# Patient Record
Sex: FEMALE | Race: WHITE | NOT HISPANIC OR LATINO | Employment: FULL TIME | URBAN - NONMETROPOLITAN AREA
[De-identification: names, ages, dates, MRNs, and addresses within clinical notes are randomized per-mention and may not be internally consistent; named-entity substitution may affect disease eponyms.]

---

## 2017-03-16 ENCOUNTER — OFFICE VISIT (OUTPATIENT)
Dept: RETAIL CLINIC | Facility: CLINIC | Age: 22
End: 2017-03-16

## 2017-03-16 DIAGNOSIS — Z23 INFLUENZA VACCINE ADMINISTERED: Primary | ICD-10-CM

## 2017-03-16 NOTE — PROGRESS NOTES
Patient presented requesting flu shot which was administered per protocol. 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 See [employer] forms.

## 2017-04-24 ENCOUNTER — TRANSCRIBE ORDERS (OUTPATIENT)
Dept: LAB | Facility: HOSPITAL | Age: 22
End: 2017-04-24

## 2017-04-24 ENCOUNTER — LAB (OUTPATIENT)
Dept: LAB | Facility: HOSPITAL | Age: 22
End: 2017-04-24

## 2017-04-24 DIAGNOSIS — R63.5 ABNORMAL WEIGHT GAIN: Primary | ICD-10-CM

## 2017-04-24 DIAGNOSIS — R63.5 ABNORMAL WEIGHT GAIN: ICD-10-CM

## 2017-04-24 LAB
25(OH)D3 SERPL-MCNC: 31.5 NG/ML (ref 30–100)
ALBUMIN SERPL-MCNC: 4.3 G/DL (ref 3.5–5)
ALBUMIN/GLOB SERPL: 1.3 G/DL (ref 1.1–2.5)
ALP SERPL-CCNC: 81 U/L (ref 24–120)
ALT SERPL W P-5'-P-CCNC: 22 U/L (ref 0–54)
ANION GAP SERPL CALCULATED.3IONS-SCNC: 11 MMOL/L (ref 4–13)
AST SERPL-CCNC: 21 U/L (ref 7–45)
AUTO MIXED CELLS #: 0.5 10*3/UL (ref 0.1–2.6)
AUTO MIXED CELLS %: 6.9 % (ref 0.1–24)
BILIRUB SERPL-MCNC: 0.5 MG/DL (ref 0.1–1)
BILIRUB UR QL STRIP: NEGATIVE
BUN BLD-MCNC: 11 MG/DL (ref 5–21)
BUN/CREAT SERPL: 13.9
CALCIUM SPEC-SCNC: 9.3 MG/DL (ref 8.4–10.4)
CHLORIDE SERPL-SCNC: 100 MMOL/L (ref 98–110)
CHOLEST SERPL-MCNC: 145 MG/DL (ref 130–200)
CLARITY UR: CLEAR
CO2 SERPL-SCNC: 28 MMOL/L (ref 24–31)
COLOR UR: YELLOW
CREAT BLD-MCNC: 0.79 MG/DL (ref 0.5–1.4)
ERYTHROCYTE [DISTWIDTH] IN BLOOD BY AUTOMATED COUNT: 12.8 % (ref 12–15)
GFR SERPL CREATININE-BSD FRML MDRD: 91 ML/MIN/1.73
GLOBULIN UR ELPH-MCNC: 3.4 GM/DL
GLUCOSE BLD-MCNC: 82 MG/DL (ref 70–100)
GLUCOSE UR STRIP-MCNC: NEGATIVE MG/DL
HBA1C MFR BLD: 5.2 %
HCT VFR BLD AUTO: 37.7 % (ref 37–47)
HDLC SERPL-MCNC: 49 MG/DL
HGB BLD-MCNC: 12.5 G/DL (ref 12–16)
HGB UR QL STRIP.AUTO: NEGATIVE
KETONES UR QL STRIP: NEGATIVE
LDLC SERPL CALC-MCNC: 79 MG/DL (ref 0–99)
LDLC/HDLC SERPL: 1.61 {RATIO}
LEUKOCYTE ESTERASE UR QL STRIP.AUTO: NEGATIVE
LYMPHOCYTES # BLD AUTO: 2 10*3/MM3 (ref 0.8–7)
LYMPHOCYTES NFR BLD AUTO: 28.7 % (ref 15–45)
MCH RBC QN AUTO: 28.4 PG (ref 28–32)
MCHC RBC AUTO-ENTMCNC: 33.2 G/DL (ref 33–36)
MCV RBC AUTO: 85.7 FL (ref 82–98)
NEUTROPHILS # BLD AUTO: 4.5 10*3/MM3 (ref 1.5–8.3)
NEUTROPHILS NFR BLD AUTO: 64.4 % (ref 39–78)
NITRITE UR QL STRIP: NEGATIVE
PH UR STRIP.AUTO: 6 [PH] (ref 5–8)
PLATELET # BLD AUTO: 298 10*3/MM3 (ref 130–400)
PMV BLD AUTO: 8.7 FL (ref 6–12)
POTASSIUM BLD-SCNC: 4.3 MMOL/L (ref 3.5–5.3)
PROT SERPL-MCNC: 7.7 G/DL (ref 6.3–8.7)
PROT UR QL STRIP: NEGATIVE
RBC # BLD AUTO: 4.4 10*6/MM3 (ref 4.2–5.4)
SODIUM BLD-SCNC: 139 MMOL/L (ref 135–145)
SP GR UR STRIP: 1.02 (ref 1–1.03)
TRIGL SERPL-MCNC: 85 MG/DL (ref 0–149)
TSH SERPL DL<=0.05 MIU/L-ACNC: 1.64 MIU/ML (ref 0.47–4.68)
UROBILINOGEN UR QL STRIP: NORMAL
VLDLC SERPL-MCNC: 17 MG/DL
WBC NRBC COR # BLD: 7 10*3/MM3 (ref 4.8–10.8)

## 2017-04-24 PROCEDURE — 83525 ASSAY OF INSULIN: CPT | Performed by: NURSE PRACTITIONER

## 2017-04-24 PROCEDURE — 36415 COLL VENOUS BLD VENIPUNCTURE: CPT

## 2017-04-24 PROCEDURE — 82306 VITAMIN D 25 HYDROXY: CPT | Performed by: NURSE PRACTITIONER

## 2017-04-24 PROCEDURE — 83036 HEMOGLOBIN GLYCOSYLATED A1C: CPT

## 2017-04-24 PROCEDURE — 80050 GENERAL HEALTH PANEL: CPT

## 2017-04-24 PROCEDURE — 80061 LIPID PANEL: CPT

## 2017-04-24 PROCEDURE — 81003 URINALYSIS AUTO W/O SCOPE: CPT

## 2017-04-25 LAB — INSULIN SERPL-ACNC: 6.9 UIU/ML (ref 2.6–24.9)

## 2018-02-27 ENCOUNTER — OFFICE VISIT (OUTPATIENT)
Dept: INTERNAL MEDICINE | Age: 23
End: 2018-02-27
Payer: COMMERCIAL

## 2018-02-27 VITALS
OXYGEN SATURATION: 98 % | HEART RATE: 87 BPM | BODY MASS INDEX: 35.79 KG/M2 | WEIGHT: 202 LBS | SYSTOLIC BLOOD PRESSURE: 122 MMHG | HEIGHT: 63 IN | DIASTOLIC BLOOD PRESSURE: 82 MMHG | RESPIRATION RATE: 16 BRPM

## 2018-02-27 DIAGNOSIS — E66.9 OBESITY (BMI 30-39.9): Primary | ICD-10-CM

## 2018-02-27 DIAGNOSIS — F31.9 BIPOLAR 1 DISORDER (HCC): ICD-10-CM

## 2018-02-27 PROCEDURE — 99214 OFFICE O/P EST MOD 30 MIN: CPT | Performed by: NURSE PRACTITIONER

## 2018-02-27 RX ORDER — LAMOTRIGINE 200 MG/1
TABLET ORAL
COMMUNITY
Start: 2018-02-02 | End: 2018-03-28 | Stop reason: SDUPTHER

## 2018-02-27 RX ORDER — PHENTERMINE HYDROCHLORIDE 15 MG/1
15 CAPSULE ORAL EVERY MORNING
Qty: 30 CAPSULE | Refills: 0 | Status: SHIPPED | OUTPATIENT
Start: 2018-02-27 | End: 2018-03-28 | Stop reason: SDUPTHER

## 2018-02-27 RX ORDER — BUPROPION HYDROCHLORIDE 150 MG/1
TABLET ORAL
COMMUNITY
Start: 2018-02-02 | End: 2018-03-28 | Stop reason: SDUPTHER

## 2018-02-27 RX ORDER — LAMOTRIGINE 100 MG/1
TABLET ORAL
COMMUNITY
Start: 2017-12-04 | End: 2018-02-27 | Stop reason: CLARIF

## 2018-02-27 ASSESSMENT — ENCOUNTER SYMPTOMS
EYE ITCHING: 0
BLOOD IN STOOL: 0
STRIDOR: 0
VOMITING: 0
CONSTIPATION: 0
COLOR CHANGE: 0
NAUSEA: 0
DIARRHEA: 0
WHEEZING: 0
ABDOMINAL PAIN: 0
CHOKING: 0
TROUBLE SWALLOWING: 0
SORE THROAT: 0
ABDOMINAL DISTENTION: 0
COUGH: 0
SHORTNESS OF BREATH: 0
EYE DISCHARGE: 0

## 2018-02-27 ASSESSMENT — PATIENT HEALTH QUESTIONNAIRE - PHQ9
2. FEELING DOWN, DEPRESSED OR HOPELESS: 0
1. LITTLE INTEREST OR PLEASURE IN DOING THINGS: 0
SUM OF ALL RESPONSES TO PHQ9 QUESTIONS 1 & 2: 0
SUM OF ALL RESPONSES TO PHQ QUESTIONS 1-9: 0

## 2018-02-27 NOTE — PROGRESS NOTES
treatment plan. Follow up as directed. MEDICATIONS:  Orders Placed This Encounter   Medications    phentermine 15 MG capsule     Sig: Take 1 capsule by mouth every morning for 30 days. Dispense:  30 capsule     Refill:  0         ORDERS:  Orders Placed This Encounter   Procedures    CBC Auto Differential    Comprehensive Metabolic Panel    TSH without Reflex       Follow-up:  Return in about 4 weeks (around 3/27/2018). PATIENT INSTRUCTIONS:  Patient Instructions   1. Obesity ; We will try phentermine for a month with diet counselling and see how she does   2. Bipolar;   She is controlled on lamictal and wellbutryn;      returnin 4 weeks with labs and physical with PAP    Electronically signed by CRISTA Cordero on 2/27/2018 at 3:35 PM

## 2018-03-27 DIAGNOSIS — E66.9 OBESITY (BMI 30-39.9): ICD-10-CM

## 2018-03-27 LAB
ALBUMIN SERPL-MCNC: 4.4 G/DL (ref 3.5–5.2)
ALP BLD-CCNC: 78 U/L (ref 35–104)
ALT SERPL-CCNC: 15 U/L (ref 5–33)
ANION GAP SERPL CALCULATED.3IONS-SCNC: 16 MMOL/L (ref 7–19)
AST SERPL-CCNC: 15 U/L (ref 5–32)
BASOPHILS ABSOLUTE: 0 K/UL (ref 0–0.2)
BASOPHILS RELATIVE PERCENT: 0.5 % (ref 0–1)
BILIRUB SERPL-MCNC: 0.3 MG/DL (ref 0.2–1.2)
BUN BLDV-MCNC: 11 MG/DL (ref 6–20)
CALCIUM SERPL-MCNC: 9.3 MG/DL (ref 8.6–10)
CHLORIDE BLD-SCNC: 100 MMOL/L (ref 98–111)
CO2: 23 MMOL/L (ref 22–29)
CREAT SERPL-MCNC: 0.7 MG/DL (ref 0.5–0.9)
EOSINOPHILS ABSOLUTE: 0.2 K/UL (ref 0–0.6)
EOSINOPHILS RELATIVE PERCENT: 2.8 % (ref 0–5)
GFR NON-AFRICAN AMERICAN: >60
GLUCOSE BLD-MCNC: 75 MG/DL (ref 74–109)
HCT VFR BLD CALC: 41.5 % (ref 37–47)
HEMOGLOBIN: 13.2 G/DL (ref 12–16)
LYMPHOCYTES ABSOLUTE: 1.5 K/UL (ref 1.1–4.5)
LYMPHOCYTES RELATIVE PERCENT: 23.9 % (ref 20–40)
MCH RBC QN AUTO: 28.6 PG (ref 27–31)
MCHC RBC AUTO-ENTMCNC: 31.8 G/DL (ref 33–37)
MCV RBC AUTO: 90 FL (ref 81–99)
MONOCYTES ABSOLUTE: 0.5 K/UL (ref 0–0.9)
MONOCYTES RELATIVE PERCENT: 8.3 % (ref 0–10)
NEUTROPHILS ABSOLUTE: 4 K/UL (ref 1.5–7.5)
NEUTROPHILS RELATIVE PERCENT: 64.2 % (ref 50–65)
PDW BLD-RTO: 13.2 % (ref 11.5–14.5)
PLATELET # BLD: 329 K/UL (ref 130–400)
PMV BLD AUTO: 9.8 FL (ref 9.4–12.3)
POTASSIUM SERPL-SCNC: 4 MMOL/L (ref 3.5–5)
RBC # BLD: 4.61 M/UL (ref 4.2–5.4)
SODIUM BLD-SCNC: 139 MMOL/L (ref 136–145)
TOTAL PROTEIN: 7.2 G/DL (ref 6.6–8.7)
TSH SERPL DL<=0.05 MIU/L-ACNC: 1.28 UIU/ML (ref 0.27–4.2)
WBC # BLD: 6.2 K/UL (ref 4.8–10.8)

## 2018-03-28 ENCOUNTER — OFFICE VISIT (OUTPATIENT)
Dept: INTERNAL MEDICINE | Age: 23
End: 2018-03-28
Payer: COMMERCIAL

## 2018-03-28 VITALS
HEIGHT: 63 IN | HEART RATE: 94 BPM | OXYGEN SATURATION: 99 % | SYSTOLIC BLOOD PRESSURE: 108 MMHG | BODY MASS INDEX: 34.2 KG/M2 | WEIGHT: 193 LBS | DIASTOLIC BLOOD PRESSURE: 64 MMHG

## 2018-03-28 DIAGNOSIS — R00.0 TACHYCARDIA: ICD-10-CM

## 2018-03-28 DIAGNOSIS — E66.9 OBESITY (BMI 30-39.9): Primary | ICD-10-CM

## 2018-03-28 DIAGNOSIS — F31.9 BIPOLAR 1 DISORDER (HCC): ICD-10-CM

## 2018-03-28 PROCEDURE — 99213 OFFICE O/P EST LOW 20 MIN: CPT | Performed by: NURSE PRACTITIONER

## 2018-03-28 RX ORDER — BUPROPION HYDROCHLORIDE 150 MG/1
150 TABLET ORAL EVERY MORNING
Qty: 30 TABLET | Refills: 3 | Status: SHIPPED | OUTPATIENT
Start: 2018-03-28 | End: 2018-06-22 | Stop reason: SDUPTHER

## 2018-03-28 RX ORDER — PHENTERMINE HYDROCHLORIDE 15 MG/1
15 CAPSULE ORAL EVERY MORNING
Qty: 30 CAPSULE | Refills: 0 | Status: SHIPPED | OUTPATIENT
Start: 2018-03-28 | End: 2018-04-23 | Stop reason: SDUPTHER

## 2018-03-28 RX ORDER — LAMOTRIGINE 200 MG/1
200 TABLET ORAL DAILY
Qty: 30 TABLET | Refills: 3 | Status: SHIPPED | OUTPATIENT
Start: 2018-03-28 | End: 2018-06-22 | Stop reason: SDUPTHER

## 2018-03-28 ASSESSMENT — ENCOUNTER SYMPTOMS
CHOKING: 0
SHORTNESS OF BREATH: 0
ABDOMINAL DISTENTION: 0
WHEEZING: 0
DIARRHEA: 0
VOMITING: 0
STRIDOR: 0
ABDOMINAL PAIN: 0
COUGH: 0
CONSTIPATION: 0
BLOOD IN STOOL: 0
COLOR CHANGE: 0
NAUSEA: 0
EYE DISCHARGE: 0
TROUBLE SWALLOWING: 0
EYE ITCHING: 0
SORE THROAT: 0

## 2018-04-23 ENCOUNTER — OFFICE VISIT (OUTPATIENT)
Dept: INTERNAL MEDICINE | Age: 23
End: 2018-04-23
Payer: COMMERCIAL

## 2018-04-23 VITALS
OXYGEN SATURATION: 98 % | DIASTOLIC BLOOD PRESSURE: 72 MMHG | HEART RATE: 91 BPM | RESPIRATION RATE: 16 BRPM | HEIGHT: 63 IN | SYSTOLIC BLOOD PRESSURE: 118 MMHG | BODY MASS INDEX: 32.78 KG/M2 | WEIGHT: 185 LBS

## 2018-04-23 DIAGNOSIS — E66.9 OBESITY (BMI 30-39.9): Primary | ICD-10-CM

## 2018-04-23 PROCEDURE — 99212 OFFICE O/P EST SF 10 MIN: CPT | Performed by: NURSE PRACTITIONER

## 2018-04-23 RX ORDER — PHENTERMINE HYDROCHLORIDE 15 MG/1
15 CAPSULE ORAL EVERY MORNING
Qty: 30 CAPSULE | Refills: 0 | Status: SHIPPED | OUTPATIENT
Start: 2018-04-23 | End: 2018-05-23 | Stop reason: SDUPTHER

## 2018-04-23 ASSESSMENT — ENCOUNTER SYMPTOMS
TROUBLE SWALLOWING: 0
CHOKING: 0
ABDOMINAL PAIN: 0
ABDOMINAL DISTENTION: 0
NAUSEA: 0
BLOOD IN STOOL: 0
EYE DISCHARGE: 0
COUGH: 0
WHEEZING: 0
SHORTNESS OF BREATH: 0
SORE THROAT: 0
DIARRHEA: 0
EYE ITCHING: 0
CONSTIPATION: 0
COLOR CHANGE: 0
STRIDOR: 0
VOMITING: 0

## 2018-05-23 ENCOUNTER — OFFICE VISIT (OUTPATIENT)
Dept: INTERNAL MEDICINE | Age: 23
End: 2018-05-23
Payer: COMMERCIAL

## 2018-05-23 VITALS
WEIGHT: 176 LBS | BODY MASS INDEX: 31.18 KG/M2 | HEART RATE: 106 BPM | RESPIRATION RATE: 16 BRPM | SYSTOLIC BLOOD PRESSURE: 120 MMHG | OXYGEN SATURATION: 99 % | HEIGHT: 63 IN | DIASTOLIC BLOOD PRESSURE: 80 MMHG

## 2018-05-23 DIAGNOSIS — E66.9 OBESITY (BMI 30-39.9): Primary | ICD-10-CM

## 2018-05-23 DIAGNOSIS — Z71.3 ENCOUNTER FOR NUTRITIONAL COUNSELING: ICD-10-CM

## 2018-05-23 DIAGNOSIS — R00.0 TACHYCARDIA: ICD-10-CM

## 2018-05-23 PROCEDURE — 99401 PREV MED CNSL INDIV APPRX 15: CPT | Performed by: NURSE PRACTITIONER

## 2018-05-23 PROCEDURE — 99213 OFFICE O/P EST LOW 20 MIN: CPT | Performed by: NURSE PRACTITIONER

## 2018-05-23 RX ORDER — PHENTERMINE HYDROCHLORIDE 15 MG/1
15 CAPSULE ORAL EVERY MORNING
Qty: 30 CAPSULE | Refills: 0 | Status: SHIPPED | OUTPATIENT
Start: 2018-05-23 | End: 2018-06-22

## 2018-06-22 ENCOUNTER — OFFICE VISIT (OUTPATIENT)
Dept: INTERNAL MEDICINE | Age: 23
End: 2018-06-22
Payer: COMMERCIAL

## 2018-06-22 VITALS
RESPIRATION RATE: 18 BRPM | DIASTOLIC BLOOD PRESSURE: 78 MMHG | OXYGEN SATURATION: 97 % | HEART RATE: 78 BPM | SYSTOLIC BLOOD PRESSURE: 122 MMHG | WEIGHT: 174 LBS | BODY MASS INDEX: 29.71 KG/M2 | HEIGHT: 64 IN

## 2018-06-22 DIAGNOSIS — R00.0 TACHYCARDIA: ICD-10-CM

## 2018-06-22 DIAGNOSIS — E66.9 OBESITY (BMI 30-39.9): Primary | ICD-10-CM

## 2018-06-22 DIAGNOSIS — F31.9 BIPOLAR 1 DISORDER (HCC): ICD-10-CM

## 2018-06-22 PROCEDURE — 99213 OFFICE O/P EST LOW 20 MIN: CPT | Performed by: NURSE PRACTITIONER

## 2018-06-22 RX ORDER — LAMOTRIGINE 200 MG/1
200 TABLET ORAL DAILY
Qty: 30 TABLET | Refills: 3 | Status: SHIPPED | OUTPATIENT
Start: 2018-06-22 | End: 2019-08-01 | Stop reason: ALTCHOICE

## 2018-06-22 RX ORDER — BUPROPION HYDROCHLORIDE 150 MG/1
150 TABLET ORAL EVERY MORNING
Qty: 30 TABLET | Refills: 3 | Status: SHIPPED | OUTPATIENT
Start: 2018-06-22 | End: 2019-08-01

## 2018-06-22 ASSESSMENT — ENCOUNTER SYMPTOMS
CONSTIPATION: 0
SHORTNESS OF BREATH: 0
VOMITING: 0
WHEEZING: 0
SORE THROAT: 0
CHOKING: 0
ABDOMINAL PAIN: 0
DIARRHEA: 0
COLOR CHANGE: 0
EYE DISCHARGE: 0
STRIDOR: 0
TROUBLE SWALLOWING: 0
COUGH: 0
EYE ITCHING: 0
BLOOD IN STOOL: 0
NAUSEA: 0
ABDOMINAL DISTENTION: 0

## 2018-07-23 ENCOUNTER — OFFICE VISIT (OUTPATIENT)
Dept: INTERNAL MEDICINE | Age: 23
End: 2018-07-23
Payer: COMMERCIAL

## 2018-07-23 VITALS
HEIGHT: 64 IN | SYSTOLIC BLOOD PRESSURE: 110 MMHG | HEART RATE: 91 BPM | RESPIRATION RATE: 16 BRPM | OXYGEN SATURATION: 98 % | DIASTOLIC BLOOD PRESSURE: 68 MMHG | BODY MASS INDEX: 28.51 KG/M2 | WEIGHT: 167 LBS

## 2018-07-23 DIAGNOSIS — E66.3 OVERWEIGHT (BMI 25.0-29.9): ICD-10-CM

## 2018-07-23 PROBLEM — E66.09 OBESITY DUE TO EXCESS CALORIES WITHOUT SERIOUS COMORBIDITY: Status: RESOLVED | Noted: 2018-07-23 | Resolved: 2018-07-23

## 2018-07-23 PROBLEM — E66.9 OBESITY (BMI 30-39.9): Status: RESOLVED | Noted: 2018-02-27 | Resolved: 2018-07-23

## 2018-07-23 PROBLEM — E66.09 OBESITY DUE TO EXCESS CALORIES WITHOUT SERIOUS COMORBIDITY: Status: ACTIVE | Noted: 2018-07-23

## 2018-07-23 PROCEDURE — 99213 OFFICE O/P EST LOW 20 MIN: CPT | Performed by: NURSE PRACTITIONER

## 2018-07-23 RX ORDER — PHENTERMINE HYDROCHLORIDE 15 MG/1
15 CAPSULE ORAL EVERY MORNING
Qty: 30 CAPSULE | Refills: 0 | Status: SHIPPED | OUTPATIENT
Start: 2018-07-23 | End: 2018-08-22 | Stop reason: SDUPTHER

## 2018-07-23 ASSESSMENT — ENCOUNTER SYMPTOMS
SHORTNESS OF BREATH: 0
ABDOMINAL DISTENTION: 0
NAUSEA: 0
ABDOMINAL PAIN: 0
TROUBLE SWALLOWING: 0
COUGH: 0
DIARRHEA: 0
VOMITING: 0
STRIDOR: 0
COLOR CHANGE: 0
EYE DISCHARGE: 0
SORE THROAT: 0
CHOKING: 0
EYE ITCHING: 0
CONSTIPATION: 0
WHEEZING: 0
BLOOD IN STOOL: 0

## 2018-07-23 NOTE — PROGRESS NOTES
tablet by mouth every morning 30 tablet 3    lamoTRIgine (LAMICTAL) 200 MG tablet Take 1 tablet by mouth daily 30 tablet 3     No current facility-administered medications for this visit. No Known Allergies    Health Maintenance   Topic Date Due    HIV screen  10/28/2009    Chlamydia screen  10/28/2010    DTaP/Tdap/Td vaccine (1 - Tdap) 10/28/2013    Cervical cancer screen  10/28/2015    Flu vaccine (1) 09/01/2018       Subjective:      Review of Systems   Constitutional: Negative for fatigue, fever and unexpected weight change. HENT: Negative for ear discharge, ear pain, mouth sores, sore throat and trouble swallowing. Eyes: Negative for discharge, itching and visual disturbance. Respiratory: Negative for cough, choking, shortness of breath, wheezing and stridor. Cardiovascular: Negative for chest pain, palpitations and leg swelling. Gastrointestinal: Negative for abdominal distention, abdominal pain, blood in stool, constipation, diarrhea, nausea and vomiting. Endocrine: Negative for cold intolerance, polydipsia and polyuria. Genitourinary: Negative for difficulty urinating, dysuria, frequency and urgency. Musculoskeletal: Negative for arthralgias and gait problem. Skin: Negative for color change and rash. Allergic/Immunologic: Negative for food allergies and immunocompromised state. Neurological: Negative for dizziness, tremors, syncope, speech difficulty, weakness and headaches. Hematological: Negative for adenopathy. Does not bruise/bleed easily. Psychiatric/Behavioral: Negative for confusion and hallucinations. Objective:     Physical Exam   Constitutional: She is oriented to person, place, and time. She appears well-developed and well-nourished. No distress. HENT:   Head: Normocephalic and atraumatic. Eyes: Pupils are equal, round, and reactive to light. Right eye exhibits no discharge. Left eye exhibits no discharge. No scleral icterus.    Neck: Normal range of Flu in 1 month with labs     Electronically signed by CRISTA Pearce on 7/23/2018 at 9:26 AM    EMR Dragon/transcription disclaimer:  Much of this encounter note is electronic transcription/translation of spoken language to printed texts. The electronic translation of spoken language may be erroneous, or at times, nonsensical words or phrases may be inadvertently transcribed.   Although I have reviewed the note for such errors, some may still exist.

## 2018-08-22 ENCOUNTER — OFFICE VISIT (OUTPATIENT)
Dept: INTERNAL MEDICINE | Age: 23
End: 2018-08-22
Payer: COMMERCIAL

## 2018-08-22 VITALS
HEIGHT: 63 IN | DIASTOLIC BLOOD PRESSURE: 72 MMHG | HEART RATE: 91 BPM | WEIGHT: 169 LBS | SYSTOLIC BLOOD PRESSURE: 118 MMHG | BODY MASS INDEX: 29.95 KG/M2 | OXYGEN SATURATION: 98 %

## 2018-08-22 DIAGNOSIS — E66.3 OVERWEIGHT (BMI 25.0-29.9): ICD-10-CM

## 2018-08-22 PROCEDURE — 99213 OFFICE O/P EST LOW 20 MIN: CPT | Performed by: NURSE PRACTITIONER

## 2018-08-22 RX ORDER — PHENTERMINE HYDROCHLORIDE 30 MG/1
30 CAPSULE ORAL EVERY MORNING
Qty: 30 CAPSULE | Refills: 0 | Status: SHIPPED | OUTPATIENT
Start: 2018-08-22 | End: 2018-09-21

## 2018-08-22 ASSESSMENT — ENCOUNTER SYMPTOMS
VOMITING: 0
CHOKING: 0
DIARRHEA: 0
WHEEZING: 0
ABDOMINAL PAIN: 0
COLOR CHANGE: 0
STRIDOR: 0
NAUSEA: 0
TROUBLE SWALLOWING: 0
COUGH: 0
EYE DISCHARGE: 0
EYE ITCHING: 0
CONSTIPATION: 0
SHORTNESS OF BREATH: 0
ABDOMINAL DISTENTION: 0
BLOOD IN STOOL: 0
SORE THROAT: 0

## 2018-08-22 NOTE — PATIENT INSTRUCTIONS
1.  OBesity   Increase phentermine to alternating 15 mg and 30 mg every other day;    Recheck in 1 month

## 2018-08-22 NOTE — PROGRESS NOTES
[facility]  [facility]  [address] [place] [address]  Dept: [phone]  Dept Fax: [phone]  Loc: [phone]    Sindy Allen is a 20 y.o. female who presents today for her medical conditions/complaints as noted below. Sindy Allen is c/o of Medication Check (Weight check for phentermine/has not lost any weight)        HPI:     HPI   1. Obesity; She has not been eating well; She and her fiance broke up and she has not been exercising either    Chief Complaint   Patient presents with    Medication Check     Weight check for phentermine/has not lost any weight       Past Medical History:   Diagnosis Date    Bipolar disorder ([facility] [address]     Depression       No past surgical history on file. Vitals 8/22/2018 7/23/2018 6/22/2018 5/23/2018 4/23/2018 9/67/4404   SYSTOLIC 514 263 [phone]   DIASTOLIC 72 68 78 80 72 64   Site Left Arm - Left Arm - - -   Position Sitting - Sitting - - -   Cuff Size - - Large Adult - - -   Pulse 91 91 78 106 91 94   Resp - 16 18 16 16 -   Weight 169 lb 167 lb 174 lb 176 lb 185 lb 193 lb   Height 5' 3\" 5' 4\" 5' 4\" 5' 3\" 5' 3\" 5' 3\"   BMI (wt*703/ht~2) 29.93 kg/m2 28.66 kg/m2 29.86 kg/m2 31.17 kg/m2 32.77 kg/m2 34.18 kg/m2   Some recent data might be hidden       Family History   Problem Relation Age of Onset    No Known Problems Mother     No Known Problems Father     No Known Problems Brother        Social History   Substance Use Topics    Smoking status: Never Smoker    Smokeless tobacco: Never Used    Alcohol use No      Current Outpatient Prescriptions   Medication Sig Dispense Refill    phentermine 30 MG capsule Take 1 capsule by mouth every morning for 30 days. . 30 capsule 0    buPROPion (WELLBUTRIN XL) 150 MG extended release tablet Take 1 tablet by mouth every morning 30 tablet 3    lamoTRIgine (LAMICTAL) 200 MG tablet Take 1 tablet by mouth daily 30 tablet 3     No current facility-administered heard.  Pulmonary/Chest: Effort normal and breath sounds normal. No respiratory distress. She has no wheezes. She has no rales. Abdominal: Soft. Bowel sounds are normal. She exhibits no distension and no mass. There is no tenderness. There is no rebound and no guarding. Musculoskeletal: Normal range of motion. She exhibits no edema or tenderness. Neurological: She is alert and oriented to person, place, and time. She has normal reflexes. No cranial nerve deficit. Coordination normal.   Skin: Skin is warm and dry. No rash noted. No erythema. Psychiatric: She has a normal mood and affect. Her behavior is normal. Judgment and thought content normal. She does not exhibit a depressed mood. /72 (Site: Left Arm, Position: Sitting)   Pulse 91   Ht 5' 3\" (1.6 m)   Wt 169 lb (76.7 kg)   LMP 08/18/2018   SpO2 98%   BMI 29.94 kg/m²     Assessment:       Diagnosis Orders   1. Overweight (BMI 25.0-29.9)  phentermine 30 MG capsule       Plan:        Patient given educational materials - see patient instructions. Discussed use, benefit, and side effects of prescribed medications. All patient questions answered. Pt voiced understanding. Reviewed health maintenance. Instructed to continue current medications, diet and exercise. Patient agreed with treatment plan. Follow up as directed. MEDICATIONS:  Orders Placed This Encounter   Medications    phentermine 30 MG capsule     Sig: Take 1 capsule by mouth every morning for 30 days. .     Dispense:  30 capsule     Refill:  0         ORDERS:  No orders of the defined types were placed in this encounter. Follow-up:  Return in about 4 weeks (around 9/19/2018).     PATIENT INSTRUCTIONS:  Patient Instructions   1.  OBesity   Increase phentermine to alternating 15 mg and 30 mg every other day;    Recheck in 1 month     Electronically signed by CRISTA Anna on 8/22/2018 at 4:56 PM    EMR Dragon/transcription disclaimer:  Much of this encounter note is electronic transcription/translation of spoken language to printed texts. The electronic translation of spoken language may be erroneous, or at times, nonsensical words or phrases may be inadvertently transcribed.   Although I have reviewed the note for such errors, some may still exist.

## 2018-09-25 ENCOUNTER — OFFICE VISIT (OUTPATIENT)
Dept: INTERNAL MEDICINE | Age: 23
End: 2018-09-25
Payer: COMMERCIAL

## 2018-09-25 VITALS
RESPIRATION RATE: 16 BRPM | WEIGHT: 169 LBS | HEART RATE: 68 BPM | SYSTOLIC BLOOD PRESSURE: 102 MMHG | BODY MASS INDEX: 29.95 KG/M2 | OXYGEN SATURATION: 96 % | DIASTOLIC BLOOD PRESSURE: 72 MMHG | HEIGHT: 63 IN

## 2018-09-25 DIAGNOSIS — E66.3 OVERWEIGHT (BMI 25.0-29.9): Primary | ICD-10-CM

## 2018-09-25 DIAGNOSIS — F32.A DEPRESSION, UNSPECIFIED DEPRESSION TYPE: ICD-10-CM

## 2018-09-25 PROCEDURE — 99213 OFFICE O/P EST LOW 20 MIN: CPT | Performed by: NURSE PRACTITIONER

## 2018-09-25 ASSESSMENT — ENCOUNTER SYMPTOMS
COUGH: 0
CONSTIPATION: 0
EYE DISCHARGE: 0
ABDOMINAL DISTENTION: 0
NAUSEA: 0
EYE ITCHING: 0
STRIDOR: 0
TROUBLE SWALLOWING: 0
CHOKING: 0
ABDOMINAL PAIN: 0
SHORTNESS OF BREATH: 0
VOMITING: 0
DIARRHEA: 0
WHEEZING: 0
BLOOD IN STOOL: 0
SORE THROAT: 0
COLOR CHANGE: 0

## 2018-09-25 NOTE — PROGRESS NOTES
[facility]  [address] [place] [address]  Dept: [phone]  Dept Fax: [phone]  Loc: [phone]    Andrew Alvarenga is a 20 y.o. female who presents today for her medical conditions/complaints as noted below. Andrew Alvarenga is c/o of Obesity (Patient here for follow up on weight.)        HPI:     HPI   1. Overweight - has been on phentermine; We had increased last month but she has not lost any weight at all;    2. Depression -  Stable for now; she and her boyfriend are back together; She is happy and plans to  in [facility]   Chief Complaint   Patient presents with    Obesity     Patient here for follow up on weight. Past Medical History:   Diagnosis Date    Bipolar disorder ([address]     Depression       History reviewed. No pertinent surgical history.     Vitals 9/25/2018 8/22/2018 7/23/2018 6/22/2018 5/23/2018 5/37/9635   SYSTOLIC 451 362 [phone] 023   DIASTOLIC 72 72 68 78 80 72   Site - Left Arm - Left Arm - -   Position - Sitting - Sitting - -   Cuff Size - - - Large Adult - -   Pulse 68 91 91 78 106 91   Resp 16 - 16 18 16 16   Weight 169 lb 169 lb 167 lb 174 lb 176 lb 185 lb   Height 5' 3\" 5' 3\" 5' 4\" 5' 4\" 5' 3\" 5' 3\"   BMI (wt*703/ht~2) 29.93 kg/m2 29.93 kg/m2 28.66 kg/m2 29.86 kg/m2 31.17 kg/m2 32.77 kg/m2   Some recent data might be hidden       Family History   Problem Relation Age of Onset    No Known Problems Mother     No Known Problems Father     No Known Problems Brother        Social History   Substance Use Topics    Smoking status: Never Smoker    Smokeless tobacco: Never Used    Alcohol use No      Current Outpatient Prescriptions   Medication Sig Dispense Refill    liraglutide-weight management 18 MG/3ML SOPN Inject 3 mg into the skin daily 3 pen 3    SPRINTEC 28 0.25-35 MG-MCG per tablet       buPROPion (WELLBUTRIN XL) 150 MG extended release tablet Take 1 tablet by mouth every morning 30

## 2018-11-14 ENCOUNTER — OFFICE VISIT (OUTPATIENT)
Dept: INTERNAL MEDICINE | Age: 23
End: 2018-11-14
Payer: COMMERCIAL

## 2018-11-14 VITALS
WEIGHT: 181 LBS | SYSTOLIC BLOOD PRESSURE: 120 MMHG | HEART RATE: 84 BPM | BODY MASS INDEX: 32.07 KG/M2 | OXYGEN SATURATION: 98 % | RESPIRATION RATE: 16 BRPM | DIASTOLIC BLOOD PRESSURE: 68 MMHG | HEIGHT: 63 IN

## 2018-11-14 DIAGNOSIS — E66.3 OVERWEIGHT: ICD-10-CM

## 2018-11-14 DIAGNOSIS — F31.9 BIPOLAR 1 DISORDER (HCC): ICD-10-CM

## 2018-11-14 DIAGNOSIS — Z00.00 HEALTH CARE MAINTENANCE: Primary | ICD-10-CM

## 2018-11-14 PROCEDURE — 99213 OFFICE O/P EST LOW 20 MIN: CPT | Performed by: NURSE PRACTITIONER

## 2018-11-14 ASSESSMENT — ENCOUNTER SYMPTOMS
DIARRHEA: 0
CHOKING: 0
TROUBLE SWALLOWING: 0
BLOOD IN STOOL: 0
CONSTIPATION: 0
EYE DISCHARGE: 0
SHORTNESS OF BREATH: 0
COLOR CHANGE: 0
COUGH: 0
ABDOMINAL PAIN: 0
STRIDOR: 0
ABDOMINAL DISTENTION: 0
EYE ITCHING: 0
WHEEZING: 0
VOMITING: 0
NAUSEA: 0
SORE THROAT: 0

## 2018-11-14 NOTE — PROGRESS NOTES
depressed mood. /68   Pulse 84   Resp 16   Ht 5' 3\" (1.6 m)   Wt 181 lb (82.1 kg)   SpO2 98%   BMI 32.06 kg/m²     Assessment:       Diagnosis Orders   1. Health care maintenance  CBC Auto Differential    Comprehensive Metabolic Panel    Lipid Panel    Vitamin D 25 Hydroxy    TSH without Reflex   2. Bipolar 1 disorder ([facility] [address]     3. Overweight  bmi 32         Plan:        Patient given educational materials - see patient instructions. Discussed use, benefit, and side effects of prescribed medications. Allpatient questions answered. Pt voiced understanding. Reviewed health maintenance. Instructed to continue current medications, diet and exercise. Patient agreed with treatment plan. Follow up as directed. MEDICATIONS:  No orders of the defined types were placed in this encounter. ORDERS:  Orders Placed This Encounter   Procedures    CBC Auto Differential    Comprehensive Metabolic Panel    Lipid Panel    Vitamin D 25 Hydroxy    TSH without Reflex       Follow-up:  Return in about 3 months (around 2/14/2019) for have labs done prior to appt. PATIENT INSTRUCTIONS:  Patient Instructions   1. Overweight;   Exercise watch portion control;    2. Bipolar stable for now     She is going to check with her insurance now that she is on husbands to see if they will cover saxenda; Fu in 3 months with labs     Electronically signed by CRISTA Moody on 11/14/2018 at 10:19 AM    EMRDragon/transcription disclaimer:  Much of this encounter note is electronic transcription/translation of spoken language to printed texts. The electronic translation of spoken language may be erroneous, or at times,nonsensical words or phrases may be inadvertently transcribed.   Although I have reviewed the note for such errors, some may still exist.

## 2018-12-31 ENCOUNTER — TELEPHONE (OUTPATIENT)
Dept: INTERNAL MEDICINE | Age: 23
End: 2018-12-31

## 2018-12-31 NOTE — TELEPHONE ENCOUNTER
Pt said that her insurance situation is fixed now and she needs saxenda called in now. If you have any questions please call her back.  Pharmacy is [facility] on [address]    Make

## 2019-01-14 ENCOUNTER — TELEPHONE (OUTPATIENT)
Dept: INTERNAL MEDICINE | Age: 24
End: 2019-01-14

## 2019-01-14 DIAGNOSIS — E66.9 OBESITY (BMI 30-39.9): Primary | ICD-10-CM

## 2019-01-14 RX ORDER — PHENTERMINE HYDROCHLORIDE 30 MG/1
30 CAPSULE ORAL EVERY MORNING
Qty: 30 CAPSULE | Refills: 0 | Status: SHIPPED | OUTPATIENT
Start: 2019-01-14 | End: 2019-03-01 | Stop reason: SDUPTHER

## 2019-03-01 ENCOUNTER — OFFICE VISIT (OUTPATIENT)
Dept: INTERNAL MEDICINE | Age: 24
End: 2019-03-01
Payer: COMMERCIAL

## 2019-03-01 VITALS
WEIGHT: 174 LBS | BODY MASS INDEX: 30.83 KG/M2 | RESPIRATION RATE: 16 BRPM | HEIGHT: 63 IN | HEART RATE: 73 BPM | DIASTOLIC BLOOD PRESSURE: 82 MMHG | SYSTOLIC BLOOD PRESSURE: 112 MMHG

## 2019-03-01 DIAGNOSIS — F31.9 BIPOLAR DEPRESSION (HCC): Primary | ICD-10-CM

## 2019-03-01 DIAGNOSIS — E66.9 OBESITY (BMI 30.0-34.9): ICD-10-CM

## 2019-03-01 DIAGNOSIS — E66.9 OBESITY (BMI 30-39.9): ICD-10-CM

## 2019-03-01 PROCEDURE — 99213 OFFICE O/P EST LOW 20 MIN: CPT | Performed by: NURSE PRACTITIONER

## 2019-03-01 RX ORDER — PHENTERMINE HYDROCHLORIDE 30 MG/1
30 CAPSULE ORAL EVERY MORNING
Qty: 30 CAPSULE | Refills: 0 | Status: SHIPPED | OUTPATIENT
Start: 2019-03-01 | End: 2019-03-31

## 2019-03-01 ASSESSMENT — ENCOUNTER SYMPTOMS
STRIDOR: 0
TROUBLE SWALLOWING: 0
EYE DISCHARGE: 0
WHEEZING: 0
COUGH: 0
COLOR CHANGE: 0
VOMITING: 0
ABDOMINAL PAIN: 0
BLOOD IN STOOL: 0
NAUSEA: 0
SORE THROAT: 0
CHOKING: 0
ABDOMINAL DISTENTION: 0
SHORTNESS OF BREATH: 0
DIARRHEA: 0
EYE ITCHING: 0
CONSTIPATION: 0

## 2019-08-01 ENCOUNTER — OFFICE VISIT (OUTPATIENT)
Dept: PRIMARY CARE CLINIC | Age: 24
End: 2019-08-01
Payer: COMMERCIAL

## 2019-08-01 VITALS
HEART RATE: 82 BPM | BODY MASS INDEX: 30.22 KG/M2 | WEIGHT: 177 LBS | HEIGHT: 64 IN | SYSTOLIC BLOOD PRESSURE: 121 MMHG | TEMPERATURE: 97.6 F | DIASTOLIC BLOOD PRESSURE: 82 MMHG

## 2019-08-01 DIAGNOSIS — E66.9 OBESITY (BMI 30-39.9): ICD-10-CM

## 2019-08-01 DIAGNOSIS — Z79.899 MEDICATION MANAGEMENT: Primary | ICD-10-CM

## 2019-08-01 DIAGNOSIS — Z00.00 ENCOUNTER FOR MEDICAL EXAMINATION TO ESTABLISH CARE: ICD-10-CM

## 2019-08-01 PROCEDURE — 99203 OFFICE O/P NEW LOW 30 MIN: CPT | Performed by: FAMILY MEDICINE

## 2019-08-01 RX ORDER — PHENTERMINE HYDROCHLORIDE 30 MG/1
30 CAPSULE ORAL EVERY MORNING
Qty: 30 CAPSULE | Refills: 0 | Status: SHIPPED | OUTPATIENT
Start: 2019-08-01 | End: 2019-08-31

## 2019-08-01 ASSESSMENT — ENCOUNTER SYMPTOMS
WHEEZING: 0
VOMITING: 0
BACK PAIN: 0
EYE DISCHARGE: 0
COLOR CHANGE: 0
COUGH: 0
DIARRHEA: 0
ABDOMINAL PAIN: 0
NAUSEA: 0

## 2019-08-01 NOTE — PROGRESS NOTES
SUBJECTIVE:    Patient ID: Arik Campoverde is a 24 y.o. female. HPI:   Patient presents today to establish care. She was previously seeing Briseida Choi at internal medicine. She states that she has a history of bipolar and depression but is not currently on any medications. She states that she has not been on anything for about a year and her symptoms have been well controlled. She states that she has a history of fighting her weight. She states that she has been on phentermine in the past and has done well with this. She is wanting to know she can get a prescription for this today. She states that she does well when she takes this and it does help her with weight loss. She states that she has not taken this in the last 3 or 4 months. She would like to get started back on it now to help with her appetite suppression of possible. Past Medical History:   Diagnosis Date    Bipolar disorder ([facility] [address]     Depression       Current Outpatient Medications   Medication Sig Dispense Refill    phentermine 30 MG capsule Take 1 capsule by mouth every morning for 30 days. 30 capsule 0     No current facility-administered medications for this visit. No Known Allergies    Review of Systems   Constitutional: Negative for activity change, appetite change and fever. HENT: Negative for congestion and nosebleeds. Eyes: Negative for discharge. Respiratory: Negative for cough and wheezing. Cardiovascular: Negative for chest pain and leg swelling. Gastrointestinal: Negative for abdominal pain, diarrhea, nausea and vomiting. Genitourinary: Negative for difficulty urinating, frequency and urgency. Musculoskeletal: Negative for back pain and gait problem. Skin: Negative for color change and rash. Neurological: Negative for dizziness and headaches. Hematological: Does not bruise/bleed easily. Psychiatric/Behavioral: Negative for sleep disturbance and suicidal ideas.        OBJECTIVE:     Physical

## 2019-09-17 ENCOUNTER — OFFICE VISIT (OUTPATIENT)
Dept: PRIMARY CARE CLINIC | Age: 24
End: 2019-09-17
Payer: COMMERCIAL

## 2019-09-17 VITALS
WEIGHT: 179 LBS | DIASTOLIC BLOOD PRESSURE: 82 MMHG | SYSTOLIC BLOOD PRESSURE: 116 MMHG | HEIGHT: 64 IN | OXYGEN SATURATION: 98 % | BODY MASS INDEX: 30.56 KG/M2 | HEART RATE: 71 BPM | TEMPERATURE: 98.2 F | RESPIRATION RATE: 22 BRPM

## 2019-09-17 DIAGNOSIS — E66.9 OBESITY (BMI 30-39.9): Primary | ICD-10-CM

## 2019-09-17 DIAGNOSIS — Z79.899 MEDICATION MANAGEMENT: ICD-10-CM

## 2019-09-17 PROCEDURE — 99213 OFFICE O/P EST LOW 20 MIN: CPT | Performed by: FAMILY MEDICINE

## 2019-09-17 RX ORDER — PHENTERMINE HYDROCHLORIDE 37.5 MG/1
37.5 CAPSULE ORAL EVERY MORNING
COMMUNITY
End: 2019-09-17 | Stop reason: SDUPTHER

## 2019-09-17 RX ORDER — PHENTERMINE HYDROCHLORIDE 37.5 MG/1
37.5 CAPSULE ORAL EVERY MORNING
Qty: 30 CAPSULE | Refills: 0 | Status: SHIPPED | OUTPATIENT
Start: 2019-09-17 | End: 2019-10-17

## 2019-09-18 ASSESSMENT — ENCOUNTER SYMPTOMS
COLOR CHANGE: 0
DIARRHEA: 0
ABDOMINAL PAIN: 0
BACK PAIN: 0
NAUSEA: 0
VOMITING: 0
EYE DISCHARGE: 0
WHEEZING: 0
COUGH: 0

## 2019-10-15 ENCOUNTER — LAB (OUTPATIENT)
Dept: LAB | Facility: HOSPITAL | Age: 24
End: 2019-10-15

## 2019-10-15 ENCOUNTER — TRANSCRIBE ORDERS (OUTPATIENT)
Dept: ADMINISTRATIVE | Facility: HOSPITAL | Age: 24
End: 2019-10-15

## 2019-10-15 DIAGNOSIS — Z32.01 PREGNANCY EXAMINATION OR TEST, POSITIVE RESULT: Primary | ICD-10-CM

## 2019-10-15 DIAGNOSIS — Z32.01 PREGNANCY TEST-POSITIVE: Primary | ICD-10-CM

## 2019-10-15 LAB
ABO GROUP BLD: NORMAL
HCG INTACT+B SERPL-ACNC: 358.9 MIU/ML
RH BLD: POSITIVE

## 2019-10-15 PROCEDURE — 87625 HPV TYPES 16 & 18 ONLY: CPT | Performed by: OBSTETRICS & GYNECOLOGY

## 2019-10-15 PROCEDURE — 86900 BLOOD TYPING SEROLOGIC ABO: CPT | Performed by: OBSTETRICS & GYNECOLOGY

## 2019-10-15 PROCEDURE — 87491 CHLMYD TRACH DNA AMP PROBE: CPT | Performed by: OBSTETRICS & GYNECOLOGY

## 2019-10-15 PROCEDURE — 36415 COLL VENOUS BLD VENIPUNCTURE: CPT | Performed by: OBSTETRICS & GYNECOLOGY

## 2019-10-15 PROCEDURE — 86901 BLOOD TYPING SEROLOGIC RH(D): CPT | Performed by: OBSTETRICS & GYNECOLOGY

## 2019-10-15 PROCEDURE — 87798 DETECT AGENT NOS DNA AMP: CPT | Performed by: OBSTETRICS & GYNECOLOGY

## 2019-10-15 PROCEDURE — 87661 TRICHOMONAS VAGINALIS AMPLIF: CPT | Performed by: OBSTETRICS & GYNECOLOGY

## 2019-10-15 PROCEDURE — 84702 CHORIONIC GONADOTROPIN TEST: CPT | Performed by: OBSTETRICS & GYNECOLOGY

## 2019-10-15 PROCEDURE — G0123 SCREEN CERV/VAG THIN LAYER: HCPCS | Performed by: OBSTETRICS & GYNECOLOGY

## 2019-10-15 PROCEDURE — 87591 N.GONORRHOEAE DNA AMP PROB: CPT | Performed by: OBSTETRICS & GYNECOLOGY

## 2019-10-15 PROCEDURE — 87624 HPV HI-RISK TYP POOLED RSLT: CPT | Performed by: OBSTETRICS & GYNECOLOGY

## 2019-10-15 PROCEDURE — 87801 DETECT AGNT MULT DNA AMPLI: CPT | Performed by: OBSTETRICS & GYNECOLOGY

## 2019-10-16 LAB
GEN CATEG CVX/VAG CYTO-IMP: ABNORMAL
HPV I/H RISK 4 DNA CVX QL PROBE+SIG AMP: DETECTED
HPV16 DNA SPEC QL NAA+PROBE: DETECTED
HPV18+45 E6+E7 MRNA CVX QL NAA+PROBE: NOT DETECTED
LAB AP CASE REPORT: ABNORMAL
LAB AP GYN ADDITIONAL INFORMATION: ABNORMAL
LAB AP GYN OTHER FINDINGS: ABNORMAL
PATH INTERP SPEC-IMP: ABNORMAL
STAT OF ADQ CVX/VAG CYTO-IMP: ABNORMAL

## 2019-10-17 LAB
A VAGINAE DNA VAG QL NAA+PROBE: NORMAL SCORE
BVAB2 DNA VAG QL NAA+PROBE: NORMAL SCORE
C ALBICANS DNA VAG QL NAA+PROBE: NEGATIVE
C GLABRATA DNA VAG QL NAA+PROBE: NEGATIVE
C TRACH RRNA SPEC DONR QL NAA+PROBE: NEGATIVE
MEGASPHAERA 1: NORMAL SCORE
N GONORRHOEA DNA SPEC QL NAA+PROBE: NEGATIVE
T VAGINALIS RRNA GENITAL QL PROBE: NEGATIVE

## 2019-10-18 LAB
EXTERNAL HEPATITIS B SURFACE ANTIGEN: NEGATIVE
EXTERNAL HEPATITIS C AB: NORMAL
EXTERNAL HERPES PCR: NORMAL
EXTERNAL RUBELLA QUALITATIVE: NORMAL
EXTERNAL SYPHILIS RPR SCREEN: NORMAL
EXTERNAL VDRL: NEGATIVE
HIV1 P24 AG SERPL QL IA: NEGATIVE

## 2019-12-31 LAB — EXTERNAL AFP: NORMAL

## 2020-01-16 ENCOUNTER — HOSPITAL ENCOUNTER (INPATIENT)
Facility: HOSPITAL | Age: 25
LOS: 4 days | Discharge: HOME | End: 2020-01-20
Attending: OBSTETRICS & GYNECOLOGY | Admitting: OBSTETRICS & GYNECOLOGY

## 2020-01-16 ENCOUNTER — HOSPITAL ENCOUNTER (EMERGENCY)
Facility: HOSPITAL | Age: 25
End: 2020-01-16

## 2020-01-16 ENCOUNTER — APPOINTMENT (OUTPATIENT)
Dept: ULTRASOUND IMAGING | Facility: HOSPITAL | Age: 25
End: 2020-01-16

## 2020-01-16 PROBLEM — O42.919 PRETERM PREMATURE RUPTURE OF MEMBRANES (PPROM) WITH UNKNOWN ONSET OF LABOR: Status: ACTIVE | Noted: 2020-01-16

## 2020-01-16 LAB
ABO GROUP BLD: NORMAL
APTT PPP: 30.7 SECONDS (ref 24.1–35)
BASOPHILS # BLD AUTO: 0.02 10*3/MM3 (ref 0–0.2)
BASOPHILS NFR BLD AUTO: 0.2 % (ref 0–1.5)
BILIRUB UR QL STRIP: NEGATIVE
BLD GP AB SCN SERPL QL: NEGATIVE
CLARITY UR: CLEAR
CLUE CELLS SPEC QL WET PREP: ABNORMAL
COLOR UR: YELLOW
D DIMER PPP FEU-MCNC: 0.78 MG/L (FEU) (ref 0–0.5)
DEPRECATED RDW RBC AUTO: 38.9 FL (ref 37–54)
EOSINOPHIL # BLD AUTO: 0.23 10*3/MM3 (ref 0–0.4)
EOSINOPHIL NFR BLD AUTO: 2.5 % (ref 0.3–6.2)
ERYTHROCYTE [DISTWIDTH] IN BLOOD BY AUTOMATED COUNT: 12.3 % (ref 12.3–15.4)
FIBRINOGEN PPP-MCNC: 455 MG/DL (ref 240–460)
FSP PPP LA-ACNC: NORMAL
GLUCOSE UR STRIP-MCNC: NEGATIVE MG/DL
HCT VFR BLD AUTO: 34.7 % (ref 34–46.6)
HGB BLD-MCNC: 11.9 G/DL (ref 12–15.9)
HGB F BLD QL KLEIH BETKE: NORMAL
HGB UR QL STRIP.AUTO: NEGATIVE
HYDATID CYST SPEC WET PREP: ABNORMAL
IMM GRANULOCYTES # BLD AUTO: 0.04 10*3/MM3 (ref 0–0.05)
IMM GRANULOCYTES NFR BLD AUTO: 0.4 % (ref 0–0.5)
INR PPP: 0.87 (ref 0.91–1.09)
KETONES UR QL STRIP: NEGATIVE
LEUKOCYTE ESTERASE UR QL STRIP.AUTO: NEGATIVE
LYMPHOCYTES # BLD AUTO: 1.77 10*3/MM3 (ref 0.7–3.1)
LYMPHOCYTES NFR BLD AUTO: 19.1 % (ref 19.6–45.3)
MCH RBC QN AUTO: 29.8 PG (ref 26.6–33)
MCHC RBC AUTO-ENTMCNC: 34.3 G/DL (ref 31.5–35.7)
MCV RBC AUTO: 86.8 FL (ref 79–97)
MONOCYTES # BLD AUTO: 0.54 10*3/MM3 (ref 0.1–0.9)
MONOCYTES NFR BLD AUTO: 5.8 % (ref 5–12)
NEUTROPHILS # BLD AUTO: 6.65 10*3/MM3 (ref 1.7–7)
NEUTROPHILS NFR BLD AUTO: 72 % (ref 42.7–76)
NITRITE UR QL STRIP: NEGATIVE
NRBC BLD AUTO-RTO: 0 /100 WBC (ref 0–0.2)
PH UR STRIP.AUTO: 7 [PH] (ref 5–8)
PLATELET # BLD AUTO: 238 10*3/MM3 (ref 140–450)
PMV BLD AUTO: 10 FL (ref 6–12)
PROT UR QL STRIP: NEGATIVE
PROTHROMBIN TIME: 12.1 SECONDS (ref 11.9–14.6)
RBC # BLD AUTO: 4 10*6/MM3 (ref 3.77–5.28)
RH BLD: POSITIVE
SP GR UR STRIP: 1.01 (ref 1–1.03)
T VAGINALIS SPEC QL WET PREP: ABNORMAL
T&S EXPIRATION DATE: NORMAL
UROBILINOGEN UR QL STRIP: NORMAL
WBC NRBC COR # BLD: 9.25 10*3/MM3 (ref 3.4–10.8)
WBC SPEC QL WET PREP: ABNORMAL
YEAST GENITAL QL WET PREP: ABNORMAL

## 2020-01-16 PROCEDURE — 87210 SMEAR WET MOUNT SALINE/INK: CPT | Performed by: OBSTETRICS & GYNECOLOGY

## 2020-01-16 PROCEDURE — 25010000002 AZITHROMYCIN PER 500 MG: Performed by: OBSTETRICS & GYNECOLOGY

## 2020-01-16 PROCEDURE — 86850 RBC ANTIBODY SCREEN: CPT | Performed by: OBSTETRICS & GYNECOLOGY

## 2020-01-16 PROCEDURE — 86900 BLOOD TYPING SEROLOGIC ABO: CPT | Performed by: OBSTETRICS & GYNECOLOGY

## 2020-01-16 PROCEDURE — 86901 BLOOD TYPING SEROLOGIC RH(D): CPT | Performed by: OBSTETRICS & GYNECOLOGY

## 2020-01-16 PROCEDURE — 85362 FIBRIN DEGRADATION PRODUCTS: CPT | Performed by: OBSTETRICS & GYNECOLOGY

## 2020-01-16 PROCEDURE — 85610 PROTHROMBIN TIME: CPT | Performed by: OBSTETRICS & GYNECOLOGY

## 2020-01-16 PROCEDURE — 85379 FIBRIN DEGRADATION QUANT: CPT | Performed by: OBSTETRICS & GYNECOLOGY

## 2020-01-16 PROCEDURE — 85384 FIBRINOGEN ACTIVITY: CPT | Performed by: OBSTETRICS & GYNECOLOGY

## 2020-01-16 PROCEDURE — 85025 COMPLETE CBC W/AUTO DIFF WBC: CPT | Performed by: OBSTETRICS & GYNECOLOGY

## 2020-01-16 PROCEDURE — 76805 OB US >/= 14 WKS SNGL FETUS: CPT

## 2020-01-16 PROCEDURE — 36415 COLL VENOUS BLD VENIPUNCTURE: CPT | Performed by: OBSTETRICS & GYNECOLOGY

## 2020-01-16 PROCEDURE — 25010000002 AMPICILLIN PER 500 MG: Performed by: OBSTETRICS & GYNECOLOGY

## 2020-01-16 PROCEDURE — 85460 HEMOGLOBIN FETAL: CPT | Performed by: OBSTETRICS & GYNECOLOGY

## 2020-01-16 PROCEDURE — 85730 THROMBOPLASTIN TIME PARTIAL: CPT | Performed by: OBSTETRICS & GYNECOLOGY

## 2020-01-16 PROCEDURE — 81003 URINALYSIS AUTO W/O SCOPE: CPT | Performed by: OBSTETRICS & GYNECOLOGY

## 2020-01-16 PROCEDURE — G0378 HOSPITAL OBSERVATION PER HR: HCPCS

## 2020-01-16 RX ORDER — SODIUM CHLORIDE 0.9 % (FLUSH) 0.9 %
10 SYRINGE (ML) INJECTION AS NEEDED
Status: DISCONTINUED | OUTPATIENT
Start: 2020-01-16 | End: 2020-01-20 | Stop reason: HOSPADM

## 2020-01-16 RX ORDER — PRENATAL VIT/IRON FUM/FOLIC AC 27MG-0.8MG
1 TABLET ORAL DAILY
COMMUNITY
End: 2020-05-26

## 2020-01-16 RX ORDER — PRENATAL VIT/IRON FUM/FOLIC AC 27MG-0.8MG
1 TABLET ORAL DAILY
Status: DISCONTINUED | OUTPATIENT
Start: 2020-01-17 | End: 2020-01-20 | Stop reason: HOSPADM

## 2020-01-16 RX ORDER — SODIUM CHLORIDE, SODIUM LACTATE, POTASSIUM CHLORIDE, CALCIUM CHLORIDE 600; 310; 30; 20 MG/100ML; MG/100ML; MG/100ML; MG/100ML
100 INJECTION, SOLUTION INTRAVENOUS CONTINUOUS
Status: DISCONTINUED | OUTPATIENT
Start: 2020-01-16 | End: 2020-01-20 | Stop reason: HOSPADM

## 2020-01-16 RX ORDER — SODIUM CHLORIDE 0.9 % (FLUSH) 0.9 %
10 SYRINGE (ML) INJECTION EVERY 12 HOURS SCHEDULED
Status: DISCONTINUED | OUTPATIENT
Start: 2020-01-16 | End: 2020-01-20 | Stop reason: HOSPADM

## 2020-01-16 RX ORDER — FERROUS SULFATE 325(65) MG
325 TABLET ORAL
Status: DISCONTINUED | OUTPATIENT
Start: 2020-01-16 | End: 2020-01-20 | Stop reason: HOSPADM

## 2020-01-16 RX ADMIN — SODIUM CHLORIDE, POTASSIUM CHLORIDE, SODIUM LACTATE AND CALCIUM CHLORIDE 125 ML/HR: 600; 310; 30; 20 INJECTION, SOLUTION INTRAVENOUS at 20:55

## 2020-01-16 RX ADMIN — AZITHROMYCIN MONOHYDRATE 500 MG: 500 INJECTION, POWDER, LYOPHILIZED, FOR SOLUTION INTRAVENOUS at 23:04

## 2020-01-16 RX ADMIN — AMPICILLIN SODIUM 2 G: 2 INJECTION, POWDER, FOR SOLUTION INTRAMUSCULAR; INTRAVENOUS at 21:24

## 2020-01-16 RX ADMIN — FERROUS SULFATE TAB 325 MG (65 MG ELEMENTAL FE) 325 MG: 325 (65 FE) TAB at 23:48

## 2020-01-17 PROCEDURE — 25010000002 AMPICILLIN PER 500 MG: Performed by: OBSTETRICS & GYNECOLOGY

## 2020-01-17 PROCEDURE — 25010000002 AZITHROMYCIN PER 500 MG: Performed by: OBSTETRICS & GYNECOLOGY

## 2020-01-17 RX ORDER — ACETAMINOPHEN 325 MG/1
650 TABLET ORAL EVERY 6 HOURS PRN
Status: DISCONTINUED | OUTPATIENT
Start: 2020-01-17 | End: 2020-01-20 | Stop reason: HOSPADM

## 2020-01-17 RX ADMIN — ACETAMINOPHEN 650 MG: 325 TABLET, FILM COATED ORAL at 18:38

## 2020-01-17 RX ADMIN — FERROUS SULFATE TAB 325 MG (65 MG ELEMENTAL FE) 325 MG: 325 (65 FE) TAB at 13:11

## 2020-01-17 RX ADMIN — FERROUS SULFATE TAB 325 MG (65 MG ELEMENTAL FE) 325 MG: 325 (65 FE) TAB at 09:10

## 2020-01-17 RX ADMIN — AMPICILLIN SODIUM 2 G: 2 INJECTION, POWDER, FOR SOLUTION INTRAMUSCULAR; INTRAVENOUS at 15:56

## 2020-01-17 RX ADMIN — AMPICILLIN SODIUM 2 G: 2 INJECTION, POWDER, FOR SOLUTION INTRAMUSCULAR; INTRAVENOUS at 03:16

## 2020-01-17 RX ADMIN — AMPICILLIN SODIUM 2 G: 2 INJECTION, POWDER, FOR SOLUTION INTRAMUSCULAR; INTRAVENOUS at 21:37

## 2020-01-17 RX ADMIN — AZITHROMYCIN MONOHYDRATE 500 MG: 500 INJECTION, POWDER, LYOPHILIZED, FOR SOLUTION INTRAVENOUS at 23:09

## 2020-01-17 RX ADMIN — AMPICILLIN SODIUM 2 G: 2 INJECTION, POWDER, FOR SOLUTION INTRAMUSCULAR; INTRAVENOUS at 09:13

## 2020-01-17 RX ADMIN — PRENATAL VIT W/ FE FUMARATE-FA TAB 27-0.8 MG 1 TABLET: 27-0.8 TAB at 09:12

## 2020-01-17 RX ADMIN — FERROUS SULFATE TAB 325 MG (65 MG ELEMENTAL FE) 325 MG: 325 (65 FE) TAB at 18:34

## 2020-01-17 RX ADMIN — SODIUM CHLORIDE, POTASSIUM CHLORIDE, SODIUM LACTATE AND CALCIUM CHLORIDE 100 ML/HR: 600; 310; 30; 20 INJECTION, SOLUTION INTRAVENOUS at 18:35

## 2020-01-17 NOTE — PLAN OF CARE
Problem: PROM, PPROM, Prolonged Rupture of Membranes (Adult,Obstetrics,Pediatric)  Goal: Signs and Symptoms of Listed Potential Problems Will be Absent, Minimized or Managed (PROM, PPROM, Prolonged Rupture of Membranes)  Outcome: Ongoing (interventions implemented as appropriate)  Flowsheets (Taken 1/17/2020 1500)  Problems Assessed (PROM, PPROM, Prolonged ROM): all  Problems Present (PROM, PPROM, ROM): none     Problem: PROM, PPROM, Prolonged Rupture of Membranes (Adult,Obstetrics,Pediatric)  Intervention: Promote/Monitor Maternal/Fetal Wellbeing  Flowsheets (Taken 1/17/2020 1500)  Fetal Wellbeing Promotion: fetal heart tones checked  Note:   FHT checked twice/shift     Problem: PROM, PPROM, Prolonged Rupture of Membranes (Adult,Obstetrics,Pediatric)  Intervention: Prevent/Manage Infection Related to Membrane Rupture  Note:   IV antibiotics every 6 hours     Problem: Patient Care Overview  Goal: Plan of Care Review  Outcome: Ongoing (interventions implemented as appropriate)  Flowsheets  Taken 1/17/2020 1646  Progress: improving  Outcome Summary: Spontaneous rupture last night @ 17w4d.  Reg diet. IV antibiotics ongoing.  No c/o UC. States she feels the baby moving.  FHTs auscultated twice this shift. Scant amount of blood before 10am.  Taken 1/17/2020 0700  Plan of Care Reviewed With: patient;spouse     Problem: Patient Care Overview  Goal: Individualization and Mutuality  Outcome: Ongoing (interventions implemented as appropriate)  Flowsheets (Taken 1/17/2020 1646)  Patient Specific Goals (Include Timeframe): Maintain pregnancy, remain afebrile  How to Address Anxieties/Fears: reassure with FHT  Patient Specific Interventions: IV antibiotics Q6 hrs, FHTs twice/shift  What Anxieties, Fears, Concerns, or Questions Do You Have About Your Care?: fear of losing fetus  Patient Specific Preferences: Up to shower and bathroom as needed; intermittent SCD use     Problem: Patient Care Overview  Goal: Discharge Needs  Assessment  Outcome: Ongoing (interventions implemented as appropriate)  Flowsheets (Taken 1/17/2020 7215)  Equipment Needed After Discharge: none  Equipment Currently Used at Home: none  Anticipated Changes Related to Illness: none  Transportation Anticipated: family or friend will provide  Transportation Concerns: car, none  Concerns to be Addressed: no discharge needs identified  Readmission Within the Last 30 Days: no previous admission in last 30 days  Patient/Family Anticipated Services at Transition: none  Patient/Family Anticipates Transition to: home with family  Offered/Gave Vendor List: no

## 2020-01-17 NOTE — PROGRESS NOTES
[facility]  Obstetric Progress Note    Subjective     Patient:    The patient feels very emotional.  Felt like she delivered baby last evening.  Upon arrival, ultrasound by me revealed intrauterine pregnancy with +FHT's in the 140's but no amniotic fluid around baby.  Reassured patient she was still pregnancy with a live baby.  Hospital ultrasound confirmed my findings.  Long d/w patient regarding prognosis of live viable birth being extremely low, however, she does not have any active bleeding or evidence of infection.  Explained we will monitor over the weekend, and then in PPROM remote from viability, can follow as outpatient until patient gets closer to viability so long as she's stable.  Prophylactic antibiotics started.  This morning she's stable without bleeding overnight, still +FHT's.  She did sleep last evening and denies contractions today.      Objective     Vital Signs Range for the last 24 hours  Temp:  [96.8 °F (36 °C)-97.8 °F (36.6 °C)] 96.8 °F (36 °C)   Temp src: Temporal   BP: (105-118)/(72-73) 105/72   Heart Rate:  [84-97] 84   Resp:  [16] 16               Weight:  [96 kg (211 lb 9 oz)] 96 kg (211 lb 9 oz)       Flowsheet Rows      First Filed Value   Admission Height  --   Admission Weight  96 kg (211 lb 9 oz) Documented at 01/16/2020 2040          Intake/Output last 24 hours:    No intake or output data in the 24 hours ending 01/17/20 0633    Intake/Output this shift:    No intake/output data recorded.    Physical Exam:  General: Patient is in no acute distress   Heart CVS exam: normal rate and regular rhythm.   Lungs Chest: no tachypnea, retractions or cyanosis.     Abdomen Abdominal exam: soft, nontender, nondistended, fundal tenderness not noted.   Extremities Exam of extremities: no pedal edema noted     Presentation: transverse   Cervix: Exam by: Method: sterile exam per physician(Dr. Ying)   Dilation: Cervical Dilation (cm): 1   Effacement:     Station:           Fetal Heart Rate  Assessment   Method: Fetal HR Assessment Method: intermittent auscultation, using Doppler   Beats/min: Fetal HR (beats/min): 140         Assessment/Plan        premature rupture of membranes (PPROM) with unknown onset of labor        Assessment:  1.  Intrauterine pregnancy at 17w4d gestation with nonviable fetal status & +FHT's.    2.  PPROM  without chorioamnionitis  3.  Obstetrical history significant for is non-contributory.  4.  No active bleeding/labor    Plan:  1. fetal and uterine monitoring  intermittent, IV antibiotics and expectant management  2. Plan of care has been reviewed with patient   3.  Risks, benefits of treatment plan have been discussed.  4.  All questions have been answered.  5.   consult when available      Ca Ying MD  2020  6:33 AM

## 2020-01-17 NOTE — NURSING NOTE
"Patient presented to LDR, stating she thinks she delivered her baby at home in the toilet.  Patient reports spotting and leaking for the last couple of days and back pain this morning. Went to the office and states \"everything was fine.\"  Later at home, patient states she had back pain and felt she needed to have a bowel movement, went to use the restroom and says \"there was so much blood, I think he came out.\"  Dr. Ying on unit, came to bedside and did bedside ultrasound. Fetus still in utero with heartbeat.  It appears no fluid around baby.  Complete OB US ordered, IV, and labs.  SVE per Dr. Ying, cervix 1cm dilated.  "

## 2020-01-17 NOTE — PAYOR COMM NOTE
REF: [id number]    [facility]  ANA,  [phone]  OR  FAX   [phone]    SternJyotsna steward (24 y.o. Female)     Date of Birth Social Security Number Address Home Phone MRN    1995  [address] [place] [address] [phone] [id number]    Uatsdin Marital Status          Caodaism        Admission Date Admission Type Admitting Provider Attending Provider Department, Room/Bed    1/16/20 Elective Ca Ying MD Mueller, Susan Kathleen, MD [facility] LABOR DELIVERY, L01/1    Discharge Date Discharge Disposition Discharge Destination                       Attending Provider:  Ca Ying MD    Allergies:  No Known Allergies    Isolation:  None   Infection:  None   Code Status:  CPR    Ht:  --   Wt:  96 kg (211 lb 9 oz)    Admission Cmt:  None   Principal Problem:  None                Active Insurance as of 1/16/2020     Primary Coverage     Payor Plan Insurance Group Employer/Plan Group    [employer] [facility] [employer] [facility] PPO [id number]     Payor Plan Address Payor Plan Phone Number Payor Plan Fax Number Effective Dates    [address] [phone]  11/3/2018 - None Entered    [place] [address]       Subscriber Name Subscriber Birth Date Member ID       BRIE STERN [birth year]/20/1990 [id number]                 Emergency Contacts      (Rel.) Home Phone Work Phone Mobile Phone    BRIE STERN (Spouse) [phone] -- --        EDC 6/20/2020   G-1  P-0  17/4 GESTATIONAL AGE    Shadia Powell, RN   Registered Nurse   Obstetrics   Nursing Note   Signed   Date of Service:  01/16/20 2030   Creation Time:  01/16/20 2107            Signed               Added by:  [x]Shadia Powell, RN      Patient presented to [facility]DR, stating she thinks she delivered her baby at home in the toilet.  Patient reports spotting and leaking for the last couple of days and back pain this morning. Went to the office and states  "\"everything was fine.\"  Later at home, patient states she had back pain and felt she needed to have a bowel movement, went to use the restroom and says \"there was so much blood, I think he came out.\"  Dr. Ying on unit, came to bedside and did bedside ultrasound. Fetus still in utero with heartbeat.  It appears no fluid around baby.  Complete OB US ordered, IV, and labs.  SVE per Dr. Ying, cervix 1cm dilated.                  Vital Signs (last day)     Date/Time   Temp   Temp src   Pulse   Resp   BP   Patient Position   SpO2    01/17/20 0730   99 (37.2)   Temporal   87   20   114/72   --   --    01/17/20 0530   97.6 (36.4)   Temporal   --   --   --   --   --    01/17/20 0320   96.8 (36)   Temporal   84   16   105/72   Lying   --    01/17/20 0030   97.8 (36.6)   Temporal   --   --   --   --   --    01/17/20 0000   97.2 (36.2)   Temporal   --   --   --   --   --    01/16/20 2204   97.6 (36.4)   Temporal   97   --   118/73   --   --    01/16/20 2030   97.4 (36.3)   Temporal   --   --   --   --   --              Facility-Administered Medications as of 1/17/2020   Medication Dose Route Frequency Provider Last Rate Last Dose   • ampicillin 2 g/100 mL 0.9% NS (MBP)  2 g Intravenous Q6H Ca Ying MD   2 g at 01/17/20 0316   • AZITHROMYCIN 500 MG/250 ML 0.9% NS IVPB (vial-mate)  500 mg Intravenous Q24H Ca Ying MD   500 mg at 01/16/20 2304   • ferrous sulfate tablet 325 mg  325 mg Oral TID With Meals Ca Ying MD   325 mg at 01/16/20 2348   • lactated ringers infusion  100 mL/hr Intravenous Continuous Ca Ying  mL/hr at 01/16/20 2252 100 mL/hr at 01/16/20 2252   • prenatal vitamin 27-0.8 tablet 1 tablet  1 tablet Oral Daily Ca Ying MD       • sodium chloride 0.9 % flush 10 mL  10 mL Intravenous Q12H Ca Ying MD       • sodium chloride 0.9 % flush 10 mL  10 mL Intravenous PRN Ca Ying MD         Orders " (last 48 hrs)      Start     Ordered    01/17/20 0900  prenatal vitamin 27-0.8 tablet 1 tablet  Daily      01/16/20 2252 01/17/20 0647  Inpatient Maternal & Fetal Medicine Consult  Once     Specialty:  Maternal and Fetal Medicine  Provider:  (Not yet assigned)    01/17/20 0646    01/17/20 0645  Inpatient Consult to Neonatology  Once     Specialty:  Neonatology  Provider:  (Not yet assigned)    01/17/20 0644    01/17/20 0017  Admit To Obstetrics Inpatient  Once      01/17/20 0016    01/16/20 2345  ferrous sulfate tablet 325 mg  3 Times Daily With Meals      01/16/20 2252 01/16/20 2253  Monitor Fetal Heart Tones  Every Shift     Comments:  Doppler FHTs twice a shift    01/16/20 2252 01/16/20 2209  Diet Regular  Diet Effective Now      01/16/20 2209 01/16/20 2130  sodium chloride 0.9 % flush 10 mL  Every 12 Hours Scheduled      01/16/20 2032 01/16/20 2130  lactated ringers infusion  Continuous      01/16/20 2032 01/16/20 2130  ampicillin 2 g/100 mL 0.9% NS (MBP)  Every 6 Hours      01/16/20 2040 01/16/20 2130  AZITHROMYCIN 500 MG/250 ML 0.9% NS IVPB (vial-mate)  Every 24 Hours      01/16/20 2040 01/16/20 2041  Place Sequential Compression Device  Once      01/16/20 2040 01/16/20 2041  Maintain Sequential Compression Device  Continuous      01/16/20 2040 01/16/20 2041  NPO Diet  Diet Effective Now,   Status:  Canceled      01/16/20 2040 01/16/20 2039  Code Status and Medical Interventions:  Continuous      01/16/20 2040 01/16/20 2039  Initiate Observation Status  Once      01/16/20 2040 01/16/20 2036  aPTT  STAT      01/16/20 2035 01/16/20 2036  Fibrinogen  Once      01/16/20 2035 01/16/20 2036  Fibrin Split Products  Once      01/16/20 2035 01/16/20 2036  D-dimer, Quantitative  Once      01/16/20 2035 01/16/20 2036  Kleihauer-Betke Stain  Once      01/16/20 2035 01/16/20 2035  Protime-INR  Once      01/16/20 2035 01/16/20 2033  CBC & Differential  STAT       01/16/20 2032 01/16/20 2033  Type & Screen  STAT      01/16/20 2032 01/16/20 2033  CBC Auto Differential  PROCEDURE ONCE      01/16/20 2032 01/16/20 2032  Insert Peripheral IV  Once      01/16/20 2032 01/16/20 2032  PERIPHERAL IV CARE - Connectors / Hubs Must Be Scrubbed 15 Seconds Using 70% Alcohol & Allowed to Dry Before Accessing Line  Continuous      01/16/20 2032 01/16/20 2032  Wet Prep, Genital - Swab, Vagina  Once      01/16/20 2032 01/16/20 2032  Urinalysis With Culture If Indicated - Urine, Catheter  STAT      01/16/20 2032 01/16/20 2031  sodium chloride 0.9 % flush 10 mL  As Needed      01/16/20 2032 01/16/20 2031  US Ob 14 + Weeks Single or First Gestation  1 Time Imaging     Comments:  COMPLETE    01/16/20 2032    Unscheduled  Change Peripheral IV Site  As Needed     Comments:  Frequency Per Facility Policy    01/16/20 2032    Unscheduled  PERIPHERAL IV CARE - Change Dressing As Needed When Damp, Loose or Soiled  As Needed      01/16/20 2032    --  Prenatal Vit-Fe Fumarate-FA (PRENATAL VITAMIN 27-0.8) 27-0.8 MG tablet tablet  Daily      01/16/20 2125                 Physician Progress Notes (last 24 hours) (Notes from 01/16/20 0833 through 01/17/20 0833)      Ca Ying MD at 01/17/20 0633          [facility]  Obstetric Progress Note    Subjective     Patient:    The patient feels very emotional.  Felt like she delivered baby last evening.  Upon arrival, ultrasound by me revealed intrauterine pregnancy with +FHT's in the 140's but no amniotic fluid around baby.  Reassured patient she was still pregnancy with a live baby.  Hospital ultrasound confirmed my findings.  Long d/w patient regarding prognosis of live viable birth being extremely low, however, she does not have any active bleeding or evidence of infection.  Explained we will monitor over the weekend, and then in PPROM remote from viability, can follow as outpatient until patient gets closer to viability so  long as she's stable.  Prophylactic antibiotics started.  This morning she's stable without bleeding overnight, still +FHT's.  She did sleep last evening and denies contractions today.      Objective     Vital Signs Range for the last 24 hours  Temp:  [96.8 °F (36 °C)-97.8 °F (36.6 °C)] 96.8 °F (36 °C)   Temp src: Temporal   BP: (105-118)/(72-73) 105/72   Heart Rate:  [84-97] 84   Resp:  [16] 16               Weight:  [96 kg (211 lb 9 oz)] 96 kg (211 lb 9 oz)       Flowsheet Rows      First Filed Value   Admission Height  --   Admission Weight  96 kg (211 lb 9 oz) Documented at 2020 2040          Intake/Output last 24 hours:    No intake or output data in the 24 hours ending 20 0633    Intake/Output this shift:    No intake/output data recorded.    Physical Exam:  General: Patient is in no acute distress   Heart CVS exam: normal rate and regular rhythm.   Lungs Chest: no tachypnea, retractions or cyanosis.     Abdomen Abdominal exam: soft, nontender, nondistended, fundal tenderness not noted.   Extremities Exam of extremities: no pedal edema noted     Presentation: transverse   Cervix: Exam by: Method: sterile exam per physician(Dr. Ying)   Dilation: Cervical Dilation (cm): 1   Effacement:     Station:           Fetal Heart Rate Assessment   Method: Fetal HR Assessment Method: intermittent auscultation, using Doppler   Beats/min: Fetal HR (beats/min): 140         Assessment/Plan        premature rupture of membranes (PPROM) with unknown onset of labor        Assessment:  1.  Intrauterine pregnancy at 17w4d gestation with nonviable fetal status & +FHT's.    2.  PPROM  without chorioamnionitis  3.  Obstetrical history significant for is non-contributory.  4.  No active bleeding/labor    Plan:  1. fetal and uterine monitoring  intermittent, IV antibiotics and expectant management  2. Plan of care has been reviewed with patient   3.  Risks, benefits of treatment plan have been discussed.  4.  All  questions have been answered.  5.   consult when available      Ca Ying MD  2020  6:33 AM    Electronically signed by Ca Ying MD at 20 0643     Study Result     ULTRASOUND PREGNANCY 2020 8:38 PM CST     REASON FOR EXAM: Premature rupture of membranes       COMPARISON: NONE.      TECHNIQUE: Multiple transverse and longitudinal real-time sonographic  images of the pelvis were obtained with a transabdominal transducer.      FINDINGS:      Number of fetuses: 1      Heart rate: 146 bpm with regular rhythm.      Presentation: Vertex      Placenta location: Posterior       Amniotic fluid: Appropriate in amount MAZIN: 0.5 cm.      Utilizing measurements of BPD, HC, AC and FL, the estimated gestational  age by ultrasound is 17.5 week.     Estimated fetal weight: 203 gm (appropriate for age).       Comments: No fetal anomalies or maternal adnexal mass identified.     IMPRESSION:     1. Severe oligohydramnios with MAZIN 0.5 cm.     2. Living intrauterine pregnancy in vertex position with estimated  gestational age of 17.5 weeks and fetal heart rate of 146 bpm.     This report was finalized on 2020 22:17 by Dr. Obie Muniz MD.

## 2020-01-17 NOTE — NURSING NOTE
Valentine Cruz NNP notified of NICU consult.  Dr. Yates is expected to see pt later this morning.  [facility] answering service notified of consult. [place] office not open today.  Message relayed to on call physician.

## 2020-01-17 NOTE — CONSULTS
Prenatal Consult    Referring OB:  Dr. Ying  Active Problems:     premature rupture of membranes (PPROM) with unknown onset of labor at 17 weeks  Plan: Expectant management per OB and Neonatology to return at 21 weeks to discuss plan of care if delivers at 23 or 24 weeks.      Reason for Consultation: potential premature single at 17 weeks gestation    Maternal History:   First last name is a 24 y.o. yr/o  with:  rupture of membranes.  The  EDC is  Estimated Date of Delivery: Estimated Date of Delivery: 20    Consult:  father, mother and several family members available for discussion.  We reviewed the fetal and  aspects of the above conditions to include: estimated survival rate 0% until 23 weeks, estimated intact survival rate 0% until 23 weeks, respiratory distress syndrome, beneficial effects of steroids, intraventricular hemorrhage, cerebral palsy, mental retardation, necrotizing enterocolitis and feeding difficulty. I explained how lungs develop in utero and where [facility] was in this process as well as how PPROM and no fluid reaccumulation affects survival. I answered their questions and parents expressed understanding of all this information.    Estimated length of stay: LINETTE  Discussed the importance of providing human milk for pre-term and late pre-term infants: yes    We will plan to attend delivery when requested.    Plan for  resuscitation: comfort care only as survival is not expected at this age, then return at 21 weeks gestation to readdress resuscitation plans for 23 and 24 weeks gestation    Additional comments: I gave parents a 1 page handout on major risks of prematurity. I answered their questions and let them know we would return at 21 weeks for further discussions on resuscitation plan of care for 23 and 24 weeks gestation. I also informed them that we would be happy to return at any time to answer questions.    Thank you for allowing us to participate in  the care of your patient. We recommend that we return at 21 weeks to readdress resuscitation plans with parents for 23 and 24 weeks gestation.    Louise Yates MD  01/17/20  3:13 PM      35 minutes were spent in consultation, greater than 85% face to face time.

## 2020-01-18 LAB
BASOPHILS # BLD AUTO: 0.01 10*3/MM3 (ref 0–0.2)
BASOPHILS NFR BLD AUTO: 0.1 % (ref 0–1.5)
DEPRECATED RDW RBC AUTO: 39.4 FL (ref 37–54)
EOSINOPHIL # BLD AUTO: 0.12 10*3/MM3 (ref 0–0.4)
EOSINOPHIL NFR BLD AUTO: 1.6 % (ref 0.3–6.2)
ERYTHROCYTE [DISTWIDTH] IN BLOOD BY AUTOMATED COUNT: 12.5 % (ref 12.3–15.4)
HCT VFR BLD AUTO: 32.5 % (ref 34–46.6)
HGB BLD-MCNC: 11 G/DL (ref 12–15.9)
IMM GRANULOCYTES # BLD AUTO: 0.01 10*3/MM3 (ref 0–0.05)
IMM GRANULOCYTES NFR BLD AUTO: 0.1 % (ref 0–0.5)
LYMPHOCYTES # BLD AUTO: 1.41 10*3/MM3 (ref 0.7–3.1)
LYMPHOCYTES NFR BLD AUTO: 18.5 % (ref 19.6–45.3)
MCH RBC QN AUTO: 29.5 PG (ref 26.6–33)
MCHC RBC AUTO-ENTMCNC: 33.8 G/DL (ref 31.5–35.7)
MCV RBC AUTO: 87.1 FL (ref 79–97)
MONOCYTES # BLD AUTO: 0.56 10*3/MM3 (ref 0.1–0.9)
MONOCYTES NFR BLD AUTO: 7.3 % (ref 5–12)
NEUTROPHILS # BLD AUTO: 5.52 10*3/MM3 (ref 1.7–7)
NEUTROPHILS NFR BLD AUTO: 72.4 % (ref 42.7–76)
NRBC BLD AUTO-RTO: 0 /100 WBC (ref 0–0.2)
PLATELET # BLD AUTO: 221 10*3/MM3 (ref 140–450)
PMV BLD AUTO: 9.9 FL (ref 6–12)
RBC # BLD AUTO: 3.73 10*6/MM3 (ref 3.77–5.28)
WBC NRBC COR # BLD: 7.63 10*3/MM3 (ref 3.4–10.8)

## 2020-01-18 PROCEDURE — 85025 COMPLETE CBC W/AUTO DIFF WBC: CPT | Performed by: OBSTETRICS & GYNECOLOGY

## 2020-01-18 PROCEDURE — 25010000002 AZITHROMYCIN PER 500 MG: Performed by: OBSTETRICS & GYNECOLOGY

## 2020-01-18 PROCEDURE — 25010000002 AMPICILLIN PER 500 MG: Performed by: OBSTETRICS & GYNECOLOGY

## 2020-01-18 PROCEDURE — 63710000001 DIPHENHYDRAMINE PER 50 MG: Performed by: OBSTETRICS & GYNECOLOGY

## 2020-01-18 RX ORDER — DIPHENHYDRAMINE HCL 25 MG
25 CAPSULE ORAL NIGHTLY PRN
Status: DISCONTINUED | OUTPATIENT
Start: 2020-01-18 | End: 2020-01-20 | Stop reason: HOSPADM

## 2020-01-18 RX ADMIN — AMPICILLIN SODIUM 2 G: 2 INJECTION, POWDER, FOR SOLUTION INTRAMUSCULAR; INTRAVENOUS at 09:19

## 2020-01-18 RX ADMIN — SODIUM CHLORIDE, POTASSIUM CHLORIDE, SODIUM LACTATE AND CALCIUM CHLORIDE 100 ML/HR: 600; 310; 30; 20 INJECTION, SOLUTION INTRAVENOUS at 03:11

## 2020-01-18 RX ADMIN — FERROUS SULFATE TAB 325 MG (65 MG ELEMENTAL FE) 325 MG: 325 (65 FE) TAB at 18:56

## 2020-01-18 RX ADMIN — PRENATAL VIT W/ FE FUMARATE-FA TAB 27-0.8 MG 1 TABLET: 27-0.8 TAB at 09:23

## 2020-01-18 RX ADMIN — AMPICILLIN SODIUM 2 G: 2 INJECTION, POWDER, FOR SOLUTION INTRAMUSCULAR; INTRAVENOUS at 15:35

## 2020-01-18 RX ADMIN — AMPICILLIN SODIUM 2 G: 2 INJECTION, POWDER, FOR SOLUTION INTRAMUSCULAR; INTRAVENOUS at 03:10

## 2020-01-18 RX ADMIN — FERROUS SULFATE TAB 325 MG (65 MG ELEMENTAL FE) 325 MG: 325 (65 FE) TAB at 12:53

## 2020-01-18 RX ADMIN — DIPHENHYDRAMINE HYDROCHLORIDE 25 MG: 25 CAPSULE ORAL at 22:21

## 2020-01-18 RX ADMIN — FERROUS SULFATE TAB 325 MG (65 MG ELEMENTAL FE) 325 MG: 325 (65 FE) TAB at 09:23

## 2020-01-18 RX ADMIN — AMPICILLIN SODIUM 2 G: 2 INJECTION, POWDER, FOR SOLUTION INTRAMUSCULAR; INTRAVENOUS at 21:13

## 2020-01-18 RX ADMIN — AZITHROMYCIN MONOHYDRATE 500 MG: 500 INJECTION, POWDER, LYOPHILIZED, FOR SOLUTION INTRAVENOUS at 21:47

## 2020-01-18 RX ADMIN — ACETAMINOPHEN 650 MG: 325 TABLET, FILM COATED ORAL at 06:28

## 2020-01-18 NOTE — NURSING NOTE
Patient ambulated to shower at 1515 and was back in bed by 1545. Pt denies pain, leaking, and bleeding after returning to bed.

## 2020-01-18 NOTE — NURSING NOTE
Patient requested to hear FHT while family members were present in order to let them hear. RN obtained FHT with doppler at 160-162 but had trouble keeping them audbile due to fetal position. Once doppler was placed on abdomen again, FHT were obtained but RN noted a current significant drop in FHT. RN moved doppler slightly in order to avoid upsetting the patient and family. RN reported FHT to patient of 160's. Notified MD of findings.

## 2020-01-19 PROCEDURE — 25010000002 AZITHROMYCIN PER 500 MG: Performed by: OBSTETRICS & GYNECOLOGY

## 2020-01-19 PROCEDURE — 25010000002 AMPICILLIN PER 500 MG: Performed by: OBSTETRICS & GYNECOLOGY

## 2020-01-19 PROCEDURE — 63710000001 DIPHENHYDRAMINE PER 50 MG: Performed by: OBSTETRICS & GYNECOLOGY

## 2020-01-19 RX ADMIN — AMPICILLIN SODIUM 2 G: 2 INJECTION, POWDER, FOR SOLUTION INTRAMUSCULAR; INTRAVENOUS at 15:13

## 2020-01-19 RX ADMIN — FERROUS SULFATE TAB 325 MG (65 MG ELEMENTAL FE) 325 MG: 325 (65 FE) TAB at 18:14

## 2020-01-19 RX ADMIN — PRENATAL VIT W/ FE FUMARATE-FA TAB 27-0.8 MG 1 TABLET: 27-0.8 TAB at 09:02

## 2020-01-19 RX ADMIN — FERROUS SULFATE TAB 325 MG (65 MG ELEMENTAL FE) 325 MG: 325 (65 FE) TAB at 14:19

## 2020-01-19 RX ADMIN — AMPICILLIN SODIUM 2 G: 2 INJECTION, POWDER, FOR SOLUTION INTRAMUSCULAR; INTRAVENOUS at 20:36

## 2020-01-19 RX ADMIN — SODIUM CHLORIDE, POTASSIUM CHLORIDE, SODIUM LACTATE AND CALCIUM CHLORIDE 100 ML/HR: 600; 310; 30; 20 INJECTION, SOLUTION INTRAVENOUS at 05:10

## 2020-01-19 RX ADMIN — AMPICILLIN SODIUM 2 G: 2 INJECTION, POWDER, FOR SOLUTION INTRAMUSCULAR; INTRAVENOUS at 03:29

## 2020-01-19 RX ADMIN — SODIUM CHLORIDE, POTASSIUM CHLORIDE, SODIUM LACTATE AND CALCIUM CHLORIDE 100 ML/HR: 600; 310; 30; 20 INJECTION, SOLUTION INTRAVENOUS at 17:01

## 2020-01-19 RX ADMIN — DIPHENHYDRAMINE HYDROCHLORIDE 25 MG: 25 CAPSULE ORAL at 21:44

## 2020-01-19 RX ADMIN — AZITHROMYCIN MONOHYDRATE 500 MG: 500 INJECTION, POWDER, LYOPHILIZED, FOR SOLUTION INTRAVENOUS at 21:42

## 2020-01-19 RX ADMIN — AMPICILLIN SODIUM 2 G: 2 INJECTION, POWDER, FOR SOLUTION INTRAMUSCULAR; INTRAVENOUS at 09:02

## 2020-01-19 RX ADMIN — FERROUS SULFATE TAB 325 MG (65 MG ELEMENTAL FE) 325 MG: 325 (65 FE) TAB at 09:02

## 2020-01-19 NOTE — PROGRESS NOTES
[facility]  Obstetric Progress Note    Subjective     Patient:    The patient feels hopeful.  Had to have a very candid discussion with patient about very low possibly of getting baby to viability without complications.    Objective     Vital Signs Range for the last 24 hours  Temp:  [98.2 °F (36.8 °C)-99.4 °F (37.4 °C)] 99.4 °F (37.4 °C)   Temp src: Temporal   BP: (104-111)/(60-74) 111/73   Heart Rate:  [90-98] 98   Resp:  [16-20] 18   SpO2:  [99 %] 99 %       Device (Oxygen Therapy): room air           Flowsheet Rows      First Filed Value   Admission Height  --   Admission Weight  96 kg (211 lb 9 oz) Documented at 2020          Intake/Output last 24 hours:      Intake/Output Summary (Last 24 hours) at 2020  Last data filed at 2020 0310  Gross per 24 hour   Intake 800 ml   Output --   Net 800 ml       Intake/Output this shift:    No intake/output data recorded.    Physical Exam:  General: Patient is comfortable   Heart CVS exam: normal rate and regular rhythm.   Lungs Chest: no tachypnea, retractions or cyanosis.     Abdomen Abdominal exam: tenderness not noted.   Extremities Exam of extremities: no pedal edema noted     Presentation: unknown   Cervix: Exam by: Method: sterile exam per physician(Dr. Ying)   Dilation: Cervical Dilation (cm): 1   Effacement:     Station:           Fetal Heart Rate Assessment   Method: Fetal HR Assessment Method: intermittent auscultation, using Doppler   Beats/min: Fetal HR (beats/min): 157(157-160)   Baseline:     Varibility:     Accels:     Decels:     Tracing Category:       Uterine Assessment   Method: Method: per patient report   Frequency (min):     Ctx Count in 10 min:     Duration:     Intensity: Contraction Intensity: no contractions   Intensity by IUPC:     Resting Tone: Uterine Resting Tone: soft by palpation   Resting Tone by IUPC:     [place]azel Units:         Assessment/Plan        premature rupture of membranes (PPROM) with unknown  onset of labor        Assessment:  1.  Intrauterine pregnancy at 17w5d gestation with +FHT's fetal status.    2.  PPROM  without chorioamnionitis  3.  Obstetrical history significant for is non-contributory.  4.  GBS status: No results found for: STREPGPB    Plan:  1. IV antibiotics and expectant management  2. Plan of care has been reviewed with patient   3.  Risks, benefits of treatment plan have been discussed.  4.  All questions have been answered.      Ca Ying MD  1/18/2020  8:28 PM

## 2020-01-19 NOTE — PROGRESS NOTES
[facility]  Obstetric Progress Note    Subjective     Patient:    The patient feels well.      Objective     Vital Signs Range for the last 24 hours  Temp:  [98 °F (36.7 °C)-99.4 °F (37.4 °C)] 98.6 °F (37 °C)   Temp src: Temporal   BP: (105-111)/(60-74) 111/73   Heart Rate:  [90-98] 98   Resp:  [16-20] 16   SpO2:  [99 %] 99 %       Device (Oxygen Therapy): room air           Flowsheet Rows      First Filed Value   Admission Height  --   Admission Weight  96 kg (211 lb 9 oz) Documented at 2020 2040          Intake/Output last 24 hours:      Intake/Output Summary (Last 24 hours) at 2020 0618  Last data filed at 2020 0509  Gross per 24 hour   Intake 1000 ml   Output --   Net 1000 ml       Intake/Output this shift:    I/O this shift:  In: 1000 [I.V.:1000]  Out: -     Physical Exam:  General: Patient is comfortable   Heart CVS exam: normal rate and regular rhythm.   Lungs Chest: no tachypnea, retractions or cyanosis.     Abdomen Abdominal exam: tenderness not noted.   Extremities Exam of extremities: no pedal edema noted     Presentation: unknown   Cervix: Exam by: Method: sterile exam per physician(Dr. Ying)   Dilation: Cervical Dilation (cm): 1   Effacement:     Station:           Fetal Heart Rate Assessment   Method: Fetal HR Assessment Method: intermittent auscultation, using Doppler   Beats/min: Fetal HR (beats/min): 160   Baseline:     Varibility:     Accels:     Decels:     Tracing Category:       Uterine Assessment   Method: Method: per patient report, palpation   Frequency (min):     Ctx Count in 10 min:     Duration:     Intensity: Contraction Intensity: no contractions   Intensity by IUPC:     Resting Tone: Uterine Resting Tone: soft by palpation   Resting Tone by IUPC:     [place] Units:         Assessment/Plan        premature rupture of membranes (PPROM) with unknown onset of labor        Assessment:  1.  Intrauterine pregnancy at 17w6d gestation with +FHT's.    2.  PPROM   without chorioamnionitis  3.  Obstetrical history significant for is non-contributory.  4.  GBS status: No results found for: STREPGPB    Plan:  1. fetal and uterine monitoring  continuously, IV antibiotics and expectant management  2. Plan of care has been reviewed with patient   3.  Risks, benefits of treatment plan have been discussed.  4.  All questions have been answered.      Ca Ying MD  1/19/2020  6:18 AM

## 2020-01-20 VITALS
OXYGEN SATURATION: 99 % | WEIGHT: 211.56 LBS | TEMPERATURE: 98.2 F | DIASTOLIC BLOOD PRESSURE: 70 MMHG | BODY MASS INDEX: 36.12 KG/M2 | SYSTOLIC BLOOD PRESSURE: 116 MMHG | HEART RATE: 93 BPM | RESPIRATION RATE: 16 BRPM | HEIGHT: 64 IN

## 2020-01-20 PROCEDURE — 25010000002 AMPICILLIN PER 500 MG: Performed by: OBSTETRICS & GYNECOLOGY

## 2020-01-20 RX ORDER — AMOXICILLIN AND CLAVULANATE POTASSIUM 875; 125 MG/1; MG/1
1 TABLET, FILM COATED ORAL EVERY 12 HOURS SCHEDULED
Status: DISCONTINUED | OUTPATIENT
Start: 2020-01-20 | End: 2020-01-20 | Stop reason: HOSPADM

## 2020-01-20 RX ORDER — AMOXICILLIN AND CLAVULANATE POTASSIUM 875; 125 MG/1; MG/1
1 TABLET, FILM COATED ORAL EVERY 12 HOURS SCHEDULED
Qty: 6 TABLET | Refills: 0 | Status: SHIPPED | OUTPATIENT
Start: 2020-01-20 | End: 2020-01-23

## 2020-01-20 RX ORDER — AMOXICILLIN AND CLAVULANATE POTASSIUM 875; 125 MG/1; MG/1
1 TABLET, FILM COATED ORAL EVERY 12 HOURS SCHEDULED
Qty: 6 TABLET | Refills: 0 | Status: SHIPPED | OUTPATIENT
Start: 2020-01-20 | End: 2020-01-20

## 2020-01-20 RX ORDER — AZITHROMYCIN 250 MG/1
500 TABLET, FILM COATED ORAL
Status: DISCONTINUED | OUTPATIENT
Start: 2020-01-20 | End: 2020-01-20 | Stop reason: HOSPADM

## 2020-01-20 RX ORDER — AZITHROMYCIN 250 MG/1
TABLET, FILM COATED ORAL
Qty: 3 TABLET | Refills: 0 | Status: ON HOLD | OUTPATIENT
Start: 2020-01-20 | End: 2020-02-03

## 2020-01-20 RX ORDER — AZITHROMYCIN 250 MG/1
TABLET, FILM COATED ORAL
Qty: 3 TABLET | Refills: 0 | Status: SHIPPED | OUTPATIENT
Start: 2020-01-20 | End: 2020-01-20

## 2020-01-20 RX ADMIN — AMPICILLIN SODIUM 2 G: 2 INJECTION, POWDER, FOR SOLUTION INTRAMUSCULAR; INTRAVENOUS at 03:47

## 2020-01-20 RX ADMIN — SODIUM CHLORIDE, POTASSIUM CHLORIDE, SODIUM LACTATE AND CALCIUM CHLORIDE 100 ML/HR: 600; 310; 30; 20 INJECTION, SOLUTION INTRAVENOUS at 03:48

## 2020-01-20 RX ADMIN — ACETAMINOPHEN 650 MG: 325 TABLET, FILM COATED ORAL at 08:06

## 2020-01-20 RX ADMIN — FERROUS SULFATE TAB 325 MG (65 MG ELEMENTAL FE) 325 MG: 325 (65 FE) TAB at 08:06

## 2020-01-20 RX ADMIN — PRENATAL VIT W/ FE FUMARATE-FA TAB 27-0.8 MG 1 TABLET: 27-0.8 TAB at 08:06

## 2020-01-20 RX ADMIN — AMPICILLIN SODIUM 2 G: 2 INJECTION, POWDER, FOR SOLUTION INTRAMUSCULAR; INTRAVENOUS at 09:06

## 2020-01-20 NOTE — PROGRESS NOTES
"[facility]  Obstetric Progress Note    Subjective     Patient:    The patient feels well.  Denies any leaking or bleeding.  Very hopeful about long term outcome with strong Moravian overtones.  Again, discussed even if we get to viability the nearly 100% chance of serious  complications.  Patient voices understanding.    Objective     Vital Signs Range for the last 24 hours  Temp:  [97.5 °F (36.4 °C)-99 °F (37.2 °C)] 98.1 °F (36.7 °C)   Temp src: Temporal   BP: (100-132)/(53-83) 132/83   Heart Rate:  [] 101   Resp:  [16] 16   SpO2:  [99 %] 99 %       Device (Oxygen Therapy): room air           Flowsheet Rows      First Filed Value   Admission Height  162.6 cm (64\") Documented at 2020 1847   Admission Weight  96 kg (211 lb 9 oz) Documented at 2020 2040          Intake/Output last 24 hours:      Intake/Output Summary (Last 24 hours) at 2020 1131  Last data filed at 2020 0449  Gross per 24 hour   Intake 3050 ml   Output --   Net 3050 ml       Intake/Output this shift:    No intake/output data recorded.    Physical Exam:  General: Patient is comfortable   Heart CVS exam: normal rate and regular rhythm.   Lungs Chest: no tachypnea, retractions or cyanosis.     Abdomen Abdominal exam: tenderness not noted.   Extremities Exam of extremities: no pedal edema noted     Presentation:    Cervix: Exam by: Method: sterile exam per physician(Dr. Ying)   Dilation: Cervical Dilation (cm): 1   Effacement:     Station:           Fetal Heart Rate Assessment   Method: Fetal HR Assessment Method: intermittent auscultation, using Doppler   Beats/min: Fetal HR (beats/min): 145   Baseline:     Varibility:     Accels:     Decels:     Tracing Category:       Uterine Assessment   Method: Method: per patient report, palpation   Frequency (min):     Ctx Count in 10 min:     Duration:     Intensity: Contraction Intensity: no contractions   Intensity by IUPC:     Resting Tone: Uterine Resting Tone: soft by " palpation   Resting Tone by IU:     Ramy Units:         Assessment/Plan        premature rupture of membranes (PPROM) with unknown onset of labor        Assessment:  1.  Intrauterine pregnancy at 18w0d gestation with +FHT's  2.  PPROM  without chorioamnionitis  3.  Obstetrical history significant for is non-contributory.  4.  No further bleeding/leaking    Plan:  1. IV antibiotics and expectant management  2. Plan of care has been reviewed with patient   3.  Risks, benefits of treatment plan have been discussed.  4.  All questions have been answered.      Ca Ying MD  2020  11:31 AM

## 2020-01-20 NOTE — CONSULTS
MFM consult    Hope is a G1 at 18 0/7 weeks today  No prior issues in the pregnancy  PROM last Thursday.     Has been an inpatient since. Receiving latency antibiotics  No signs of infection at this time.     US today  Vertex presentation  Overall size is consistent with dates.   Limited anatomy appeared without issue. The kidneys and bladder are visualized.   Anyhydramnios    Long discussion with the couple regarding the issues created with second trimester PROM    We discussed the possibility of a pre-viable or sachin-viable delivery and the associated impacts on the baby (including death or significant morbidity).   We also discussed the importance of the fluid in lung formation and joint mobility. Potential for significant pulmonary hypoplasia with loss of fluid at the early part of the canalicular phase of development.     We discussed the use of the antibiotics in (hopefully) lengthening latency.   We discussed the concerns for infection and the need for delivery if there are any signs of infection, independent of gestational age, due to the risk for significant maternal morbidity or mortality.     We discussed that the likelihood for taking a baby home is quite low, <5%.   Couple do wish to continue the pregnancy despite the risks.       Recommend  Changing to outpatient management  Watching for signs/symptoms of infection--to call/present if changes or concerns  Complete a week of latency antibiotics, then discontinue.   Will have return to my office for re-evalation and further discussion in 2 weeks.     Discussed case with Dr Ying.     All questions answered. Time on floor/unit for chart review, patient evaluation, discussion and coordination of care was >45 minutes with >50% in face to face counseling and coordination of care on the issues as listed above.     As always, if there are any questions/concerns, please do not hesitate to contact me.   Thanks  Rustam  [phone]

## 2020-01-20 NOTE — DISCHARGE SUMMARY
Discharge Summary     Christiano Peterson  : 1995  MRN: [id number]  CSN: [id number]    Date of Admission: 2020   Date of Discharge:  2020   Physician: Ca Ying MD       Admission Diagnosis: 1.  premature rupture of membranes (PPROM) with unknown onset of labor [O42.919]  2.  premature rupture of membranes (PPROM) with unknown onset of labor [O42.919]   Discharge Diagnosis: 3.  premature rupture of membranes (PPROM) with unknown onset of labor [O42.919]  4.  premature rupture of membranes (PPROM) with unknown onset of labor [O42.919]       Procedures:      Hospital Course  Patient is a 24 y.o.  who at 18w0d was admitted forPreterm premature rupture of membranes (PPROM) with unknown onset of labor [O42.919]   premature rupture of membranes (PPROM) with unknown onset of labor [O42.919] .  Her hosptial course was without complications.  On Hospital Day: 5 she was ready for discharge.  Her Active Problems:     premature rupture of membranes (PPROM) with unknown onset of labor was well controlled with     Discharge Medications      New Medications      Instructions Start Date   amoxicillin-clavulanate 875-125 MG per tablet  Commonly known as:  AUGMENTIN   1 tablet, Oral, Every 12 Hours Scheduled      azithromycin 250 MG tablet  Commonly known as:  ZITHROMAX   Take 2 tablets the first day, then 1 tablet daily for 4 days.         Continue These Medications      Instructions Start Date   prenatal vitamin 27-0.8 27-0.8 MG tablet tablet   1 tablet, Oral, Daily          .    Discharge labs  Lab Results   Component Value Date    WBC 7.63 2020    HGB 11.0 (L) 2020    HCT 32.5 (L) 2020     2020       Discharge Medications     Discharge Medications      New Medications      Instructions Start Date   amoxicillin-clavulanate 875-125 MG per tablet  Commonly known as:  AUGMENTIN   1 tablet, Oral, Every 12 Hours Scheduled       azithromycin 250 MG tablet  Commonly known as:  ZITHROMAX   Take 2 tablets the first day, then 1 tablet daily for 4 days.         Continue These Medications      Instructions Start Date   prenatal vitamin 27-0.8 27-0.8 MG tablet tablet   1 tablet, Oral, Daily             Discharge Disposition Home or Self Care   Condition on Discharge: good   Follow-up: 2 weeks with MD Ca Porter MD  1/20/2020

## 2020-01-20 NOTE — NURSING NOTE
POC reviewed with patient, family, MD, and MFM. Patient discharge order and educated on s/s of infection, pelvic pressure, and when to call or return to hospital. F/u with MFM in two weeks. M ultrasound presented MAZIN of 0 and heart tones of 136.

## 2020-01-21 NOTE — PAYOR COMM NOTE
"DC HOME 20  [id number]    [phone]    Jyotsna Stern (24 y.o. Female)     Date of Birth Social Security Number Address Home Phone MRN    1995  [address] [place] [address] [phone] [id number]    Hoahaoism Marital Status          Hoahaoism        Admission Date Admission Type Admitting Provider Attending Provider Department, Room/Bed    20 Elective Ca Ying MD  [facility] LABOR DELIVERY, L02    Discharge Date Discharge Disposition Discharge Destination        2020 Home or Self Care              Attending Provider:  (none)   Allergies:  No Known Allergies    Isolation:  None   Infection:  None   Code Status:  Prior    Ht:  162.6 cm (64\")   Wt:  96 kg (211 lb 9 oz)    Admission Cmt:  None   Principal Problem:  None                Active Insurance as of 2020     Primary Coverage     Payor Plan Insurance Group Employer/Plan Group    ANTHEM BLUE CROSS ANTHEM BLUE CROSS BLUE SHIELD PPO [id number]     Payor Plan Address Payor Plan Phone Number Payor Plan Fax Number Effective Dates    [address] [phone]  11/3/2018 - None Entered    [facility] [address]       Subscriber Name Subscriber Birth Date Member ID       BRIE STERN 1991 [id number]                 Emergency Contacts      (Rel.) Home Phone Work Phone Mobile Phone    BRIE STERN (Spouse) [phone] -- --               Physician Progress Notes (last 7 days) (Notes from 20 0751 through 20 0751)      Ca Ying MD at 20 1131          [facility]  Obstetric Progress Note    Subjective     Patient:    The patient feels well.  Denies any leaking or bleeding.  Very hopeful about long term outcome with strong Yarsani overtones.  Again, discussed even if we get to viability the nearly 100% chance of serious  complications.  Patient voices understanding.    Objective     Vital Signs Range for the last 24 " "hours  Temp:  [97.5 °F (36.4 °C)-99 °F (37.2 °C)] 98.1 °F (36.7 °C)   Temp src: Temporal   BP: (100-132)/(53-83) 132/83   Heart Rate:  [] 101   Resp:  [16] 16   SpO2:  [99 %] 99 %       Device (Oxygen Therapy): room air           Flowsheet Rows      First Filed Value   Admission Height  162.6 cm (64\") Documented at 2020 1847   Admission Weight  96 kg (211 lb 9 oz) Documented at 2020 2040          Intake/Output last 24 hours:      Intake/Output Summary (Last 24 hours) at 2020 1131  Last data filed at 2020 0449  Gross per 24 hour   Intake 3050 ml   Output --   Net 3050 ml       Intake/Output this shift:    No intake/output data recorded.    Physical Exam:  General: Patient is comfortable   Heart CVS exam: normal rate and regular rhythm.   Lungs Chest: no tachypnea, retractions or cyanosis.     Abdomen Abdominal exam: tenderness not noted.   Extremities Exam of extremities: no pedal edema noted     Presentation:    Cervix: Exam by: Method: sterile exam per physician(Dr. Ying)   Dilation: Cervical Dilation (cm): 1   Effacement:     Station:           Fetal Heart Rate Assessment   Method: Fetal HR Assessment Method: intermittent auscultation, using Doppler   Beats/min: Fetal HR (beats/min): 145   Baseline:     Varibility:     Accels:     Decels:     Tracing Category:       Uterine Assessment   Method: Method: per patient report, palpation   Frequency (min):     Ctx Count in 10 min:     Duration:     Intensity: Contraction Intensity: no contractions   Intensity by IUPC:     Resting Tone: Uterine Resting Tone: soft by palpation   Resting Tone by IUPC:     Ramy Units:         Assessment/Plan        premature rupture of membranes (PPROM) with unknown onset of labor        Assessment:  1.  Intrauterine pregnancy at 18w0d gestation with +FHT's  2.  PPROM  without chorioamnionitis  3.  Obstetrical history significant for is non-contributory.  4.  No further " bleeding/leaking    Plan:  1. IV antibiotics and expectant management  2. Plan of care has been reviewed with patient   3.  Risks, benefits of treatment plan have been discussed.  4.  All questions have been answered.      Ca Ying MD  1/20/2020  11:31 AM    Electronically signed by Ca Ying MD at 01/20/20 1133     Ca Ying MD at 01/19/20 0618          [facility]  Obstetric Progress Note    Subjective     Patient:    The patient feels well.      Objective     Vital Signs Range for the last 24 hours  Temp:  [98 °F (36.7 °C)-99.4 °F (37.4 °C)] 98.6 °F (37 °C)   Temp src: Temporal   BP: (105-111)/(60-74) 111/73   Heart Rate:  [90-98] 98   Resp:  [16-20] 16   SpO2:  [99 %] 99 %       Device (Oxygen Therapy): room air           Flowsheet Rows      First Filed Value   Admission Height  --   Admission Weight  96 kg (211 lb 9 oz) Documented at 01/16/2020 2040          Intake/Output last 24 hours:      Intake/Output Summary (Last 24 hours) at 1/19/2020 0618  Last data filed at 1/19/2020 0509  Gross per 24 hour   Intake 1000 ml   Output --   Net 1000 ml       Intake/Output this shift:    I/O this shift:  In: 1000 [I.V.:1000]  Out: -     Physical Exam:  General: Patient is comfortable   Heart CVS exam: normal rate and regular rhythm.   Lungs Chest: no tachypnea, retractions or cyanosis.     Abdomen Abdominal exam: tenderness not noted.   Extremities Exam of extremities: no pedal edema noted     Presentation: unknown   Cervix: Exam by: Method: sterile exam per physician(Dr. Ying)   Dilation: Cervical Dilation (cm): 1   Effacement:     Station:           Fetal Heart Rate Assessment   Method: Fetal HR Assessment Method: intermittent auscultation, using Doppler   Beats/min: Fetal HR (beats/min): 160   Baseline:     Varibility:     Accels:     Decels:     Tracing Category:       Uterine Assessment   Method: Method: per patient report, palpation   Frequency (min):     Ctx Count in 10  min:     Duration:     Intensity: Contraction Intensity: no contractions   Intensity by IUPC:     Resting Tone: Uterine Resting Tone: soft by palpation   Resting Tone by IUPC:     Ramy Units:         Assessment/Plan        premature rupture of membranes (PPROM) with unknown onset of labor        Assessment:  1.  Intrauterine pregnancy at 17w6d gestation with +FHT's.    2.  PPROM  without chorioamnionitis  3.  Obstetrical history significant for is non-contributory.  4.  GBS status: No results found for: STREPGPB    Plan:  1. fetal and uterine monitoring  continuously, IV antibiotics and expectant management  2. Plan of care has been reviewed with patient   3.  Risks, benefits of treatment plan have been discussed.  4.  All questions have been answered.      Ca Ying MD  2020  6:18 AM    Electronically signed by Ca Ying MD at 20 0620     Ca Ying MD at 20          [facility]  Obstetric Progress Note    Subjective     Patient:    The patient feels hopeful.  Had to have a very candid discussion with patient about very low possibly of getting baby to viability without complications.    Objective     Vital Signs Range for the last 24 hours  Temp:  [98.2 °F (36.8 °C)-99.4 °F (37.4 °C)] 99.4 °F (37.4 °C)   Temp src: Temporal   BP: (104-111)/(60-74) 111/73   Heart Rate:  [90-98] 98   Resp:  [16-20] 18   SpO2:  [99 %] 99 %       Device (Oxygen Therapy): room air           Flowsheet Rows      First Filed Value   Admission Height  --   Admission Weight  96 kg (211 lb 9 oz) Documented at 2020          Intake/Output last 24 hours:      Intake/Output Summary (Last 24 hours) at 2020  Last data filed at 2020 0310  Gross per 24 hour   Intake 800 ml   Output --   Net 800 ml       Intake/Output this shift:    No intake/output data recorded.    Physical Exam:  General: Patient is comfortable   Heart CVS exam: normal rate and  regular rhythm.   Lungs Chest: no tachypnea, retractions or cyanosis.     Abdomen Abdominal exam: tenderness not noted.   Extremities Exam of extremities: no pedal edema noted     Presentation: unknown   Cervix: Exam by: Method: sterile exam per physician(Dr. Ying)   Dilation: Cervical Dilation (cm): 1   Effacement:     Station:           Fetal Heart Rate Assessment   Method: Fetal HR Assessment Method: intermittent auscultation, using Doppler   Beats/min: Fetal HR (beats/min): 157(157-160)   Baseline:     Varibility:     Accels:     Decels:     Tracing Category:       Uterine Assessment   Method: Method: per patient report   Frequency (min):     Ctx Count in 10 min:     Duration:     Intensity: Contraction Intensity: no contractions   Intensity by IUPC:     Resting Tone: Uterine Resting Tone: soft by palpation   Resting Tone by IUPC:     [place] Units:         Assessment/Plan        premature rupture of membranes (PPROM) with unknown onset of labor        Assessment:  1.  Intrauterine pregnancy at 17w5d gestation with +FHT's fetal status.    2.  PPROM  without chorioamnionitis  3.  Obstetrical history significant for is non-contributory.  4.  GBS status: No results found for: STREPGPB    Plan:  1. IV antibiotics and expectant management  2. Plan of care has been reviewed with patient   3.  Risks, benefits of treatment plan have been discussed.  4.  All questions have been answered.      Ca Ying MD  2020  8:28 PM    Electronically signed by Ca Ying MD at 20     Ca Ying MD at 20 0633          [facility]  Obstetric Progress Note    Subjective     Patient:    The patient feels very emotional.  Felt like she delivered baby last evening.  Upon arrival, ultrasound by me revealed intrauterine pregnancy with +FHT's in the 140's but no amniotic fluid around baby.  Reassured patient she was still pregnancy with a live baby.  [facility]  ultrasound confirmed my findings.  Long d/w patient regarding prognosis of live viable birth being extremely low, however, she does not have any active bleeding or evidence of infection.  Explained we will monitor over the weekend, and then in PPROM remote from viability, can follow as outpatient until patient gets closer to viability so long as she's stable.  Prophylactic antibiotics started.  This morning she's stable without bleeding overnight, still +FHT's.  She did sleep last evening and denies contractions today.      Objective     Vital Signs Range for the last 24 hours  Temp:  [96.8 °F (36 °C)-97.8 °F (36.6 °C)] 96.8 °F (36 °C)   Temp src: Temporal   BP: (105-118)/(72-73) 105/72   Heart Rate:  [84-97] 84   Resp:  [16] 16               Weight:  [96 kg (211 lb 9 oz)] 96 kg (211 lb 9 oz)       Flowsheet Rows      First Filed Value   Admission Height  --   Admission Weight  96 kg (211 lb 9 oz) Documented at 2020 2040          Intake/Output last 24 hours:    No intake or output data in the 24 hours ending 20 0633    Intake/Output this shift:    No intake/output data recorded.    Physical Exam:  General: Patient is in no acute distress   Heart CVS exam: normal rate and regular rhythm.   Lungs Chest: no tachypnea, retractions or cyanosis.     Abdomen Abdominal exam: soft, nontender, nondistended, fundal tenderness not noted.   Extremities Exam of extremities: no pedal edema noted     Presentation: transverse   Cervix: Exam by: Method: sterile exam per physician(Dr. Ying)   Dilation: Cervical Dilation (cm): 1   Effacement:     Station:           Fetal Heart Rate Assessment   Method: Fetal HR Assessment Method: intermittent auscultation, using Doppler   Beats/min: Fetal HR (beats/min): 140         Assessment/Plan        premature rupture of membranes (PPROM) with unknown onset of labor        Assessment:  1.  Intrauterine pregnancy at 17w4d gestation with nonviable fetal status & +FHT's.    2.   PPROM  without chorioamnionitis  3.  Obstetrical history significant for is non-contributory.  4.  No active bleeding/labor    Plan:  1. fetal and uterine monitoring  intermittent, IV antibiotics and expectant management  2. Plan of care has been reviewed with patient   3.  Risks, benefits of treatment plan have been discussed.  4.  All questions have been answered.  5.   consult when available      Ca Ying MD  2020  6:33 AM    Electronically signed by Ca Ying MD at 20 0643          Consult Notes (last 7 days) (Notes from 20 through 20)      Louise Yates MD at 20 1430      Consult Orders    1. Inpatient Consult to Neonatology [[id number]] ordered by Ca Ying MD at 20 0644              Prenatal Consult    Referring OB:  Dr. Ying  Active Problems:     premature rupture of membranes (PPROM) with unknown onset of labor at 17 weeks  Plan: Expectant management per OB and Neonatology to return at 21 weeks to discuss plan of care if delivers at 23 or 24 weeks.      Reason for Consultation: potential premature single at 17 weeks gestation    Maternal History:   First last name is a 24 y.o. yr/o  with:  rupture of membranes.  The  EDC is  Estimated Date of Delivery: Estimated Date of Delivery: 20    Consult:  father, mother and several family members available for discussion.  We reviewed the fetal and  aspects of the above conditions to include: estimated survival rate 0% until 23 weeks, estimated intact survival rate 0% until 23 weeks, respiratory distress syndrome, beneficial effects of steroids, intraventricular hemorrhage, cerebral palsy, mental retardation, necrotizing enterocolitis and feeding difficulty. I explained how lungs develop in utero and where [facility] was in this process as well as how PPROM and no fluid reaccumulation affects survival. I answered their questions and parents  expressed understanding of all this information.    Estimated length of stay: LINETTE  Discussed the importance of providing human milk for pre-term and late pre-term infants: yes    We will plan to attend delivery when requested.    Plan for  resuscitation: comfort care only as survival is not expected at this age, then return at 21 weeks gestation to readdress resuscitation plans for 23 and 24 weeks gestation    Additional comments: I gave parents a 1 page handout on major risks of prematurity. I answered their questions and let them know we would return at 21 weeks for further discussions on resuscitation plan of care for 23 and 24 weeks gestation. I also informed them that we would be happy to return at any time to answer questions.    Thank you for allowing us to participate in the care of your patient. We recommend that we return at 21 weeks to readdress resuscitation plans with parents for 23 and 24 weeks gestation.    Louise Yates MD  20  3:13 PM      35 minutes were spent in consultation, greater than 85% face to face time.       Electronically signed by Louise Yates MD at 20 1524     Saji Castro MD at 20 1632      Consult Orders    1. Inpatient Maternal & Fetal Medicine Consult [[id number]] ordered by Ca Ynig MD at 20 0646              M consult    Hope is a G1 at 18 0/7 weeks today  No prior issues in the pregnancy  PROM last Thursday.     Has been an inpatient since. Receiving latency antibiotics  No signs of infection at this time.     US today  Vertex presentation  Overall size is consistent with dates.   Limited anatomy appeared without issue. The kidneys and bladder are visualized.   Anyhydramnios    Long discussion with the couple regarding the issues created with second trimester PROM    We discussed the possibility of a pre-viable or sachin-viable delivery and the associated impacts on the baby (including death or significant  morbidity).   We also discussed the importance of the fluid in lung formation and joint mobility. Potential for significant pulmonary hypoplasia with loss of fluid at the early part of the canalicular phase of development.     We discussed the use of the antibiotics in (hopefully) lengthening latency.   We discussed the concerns for infection and the need for delivery if there are any signs of infection, independent of gestational age, due to the risk for significant maternal morbidity or mortality.     We discussed that the likelihood for taking a baby home is quite low, <5%.   Couple do wish to continue the pregnancy despite the risks.       Recommend  Changing to outpatient management  Watching for signs/symptoms of infection--to call/present if changes or concerns  Complete a week of latency antibiotics, then discontinue.   Will have return to my office for re-evalation and further discussion in 2 weeks.     Discussed case with Dr Ying.     All questions answered. Time on floor/unit for chart review, patient evaluation, discussion and coordination of care was >45 minutes with >50% in face to face counseling and coordination of care on the issues as listed above.     As always, if there are any questions/concerns, please do not hesitate to contact me.   Thanks  Rustam  [phone]          Electronically signed by Saji Castro MD at 20          Discharge Summary      Ca Ying MD at 20          Discharge Summary     Newfaneeri Peterson  : 1995  MRN: [id number]  CSN: [id number]    Date of Admission: 2020   Date of Discharge:  2020   Physician: Ca Ying MD       Admission Diagnosis: 1.  premature rupture of membranes (PPROM) with unknown onset of labor [O42.919]  2.  premature rupture of membranes (PPROM) with unknown onset of labor [O42.919]   Discharge Diagnosis: 3.  premature rupture of membranes (PPROM)  with unknown onset of labor [O42.919]  4.  premature rupture of membranes (PPROM) with unknown onset of labor [O42.919]       Procedures:      Hospital Course  Patient is a 24 y.o.  who at 18w0d was admitted forPreterm premature rupture of membranes (PPROM) with unknown onset of labor [O42.919]   premature rupture of membranes (PPROM) with unknown onset of labor [O42.919] .  Her hosptial course was without complications.  On Hospital Day: 5 she was ready for discharge.  Her Active Problems:     premature rupture of membranes (PPROM) with unknown onset of labor was well controlled with     Discharge Medications      New Medications      Instructions Start Date   amoxicillin-clavulanate 875-125 MG per tablet  Commonly known as:  AUGMENTIN   1 tablet, Oral, Every 12 Hours Scheduled      azithromycin 250 MG tablet  Commonly known as:  ZITHROMAX   Take 2 tablets the first day, then 1 tablet daily for 4 days.         Continue These Medications      Instructions Start Date   prenatal vitamin 27-0.8 27-0.8 MG tablet tablet   1 tablet, Oral, Daily          .    Discharge labs  Lab Results   Component Value Date    WBC 7.63 2020    HGB 11.0 (L) 2020    HCT 32.5 (L) 2020     2020       Discharge Medications     Discharge Medications      New Medications      Instructions Start Date   amoxicillin-clavulanate 875-125 MG per tablet  Commonly known as:  AUGMENTIN   1 tablet, Oral, Every 12 Hours Scheduled      azithromycin 250 MG tablet  Commonly known as:  ZITHROMAX   Take 2 tablets the first day, then 1 tablet daily for 4 days.         Continue These Medications      Instructions Start Date   prenatal vitamin 27-0.8 27-0.8 MG tablet tablet   1 tablet, Oral, Daily             Discharge Disposition Home or Self Care   Condition on Discharge: good   Follow-up: 2 weeks with MD Ca Porter MD  2020    Electronically signed by Stepan  Ca Pride MD at 01/20/20 4918

## 2020-02-03 ENCOUNTER — HOSPITAL ENCOUNTER (INPATIENT)
Facility: HOSPITAL | Age: 25
LOS: 1 days | Discharge: HOME | End: 2020-02-04
Attending: OBSTETRICS & GYNECOLOGY | Admitting: OBSTETRICS & GYNECOLOGY

## 2020-02-03 ENCOUNTER — APPOINTMENT (OUTPATIENT)
Dept: ULTRASOUND IMAGING | Facility: HOSPITAL | Age: 25
End: 2020-02-03

## 2020-02-03 ENCOUNTER — ANESTHESIA EVENT (OUTPATIENT)
Dept: LABOR AND DELIVERY | Facility: HOSPITAL | Age: 25
End: 2020-02-03

## 2020-02-03 ENCOUNTER — ANESTHESIA (OUTPATIENT)
Dept: LABOR AND DELIVERY | Facility: HOSPITAL | Age: 25
End: 2020-02-03

## 2020-02-03 PROBLEM — O42.919 PRETERM PREMATURE RUPTURE OF MEMBRANES (PPROM) WITH UNKNOWN ONSET OF LABOR: Status: RESOLVED | Noted: 2020-01-16 | Resolved: 2020-02-03

## 2020-02-03 PROBLEM — Z87.59 HISTORY OF PRETERM PREMATURE RUPTURE OF MEMBRANES (PPROM): Status: ACTIVE | Noted: 2020-02-03

## 2020-02-03 PROBLEM — Z87.59 HISTORY OF PRETERM PREMATURE RUPTURE OF MEMBRANES (PPROM): Status: RESOLVED | Noted: 2020-02-03 | Resolved: 2020-02-03

## 2020-02-03 LAB
ABO GROUP BLD: NORMAL
BLD GP AB SCN SERPL QL: NEGATIVE
DEPRECATED RDW RBC AUTO: 38.7 FL (ref 37–54)
ERYTHROCYTE [DISTWIDTH] IN BLOOD BY AUTOMATED COUNT: 12.6 % (ref 12.3–15.4)
HCT VFR BLD AUTO: 34.3 % (ref 34–46.6)
HGB BLD-MCNC: 11.9 G/DL (ref 12–15.9)
MCH RBC QN AUTO: 29.8 PG (ref 26.6–33)
MCHC RBC AUTO-ENTMCNC: 34.7 G/DL (ref 31.5–35.7)
MCV RBC AUTO: 85.8 FL (ref 79–97)
PLATELET # BLD AUTO: 240 10*3/MM3 (ref 140–450)
PMV BLD AUTO: 10.5 FL (ref 6–12)
RBC # BLD AUTO: 4 10*6/MM3 (ref 3.77–5.28)
RH BLD: POSITIVE
T&S EXPIRATION DATE: NORMAL
WBC NRBC COR # BLD: 12.34 10*3/MM3 (ref 3.4–10.8)

## 2020-02-03 PROCEDURE — 87176 TISSUE HOMOGENIZATION CULTR: CPT | Performed by: OBSTETRICS & GYNECOLOGY

## 2020-02-03 PROCEDURE — 86901 BLOOD TYPING SEROLOGIC RH(D): CPT | Performed by: OBSTETRICS & GYNECOLOGY

## 2020-02-03 PROCEDURE — 25010000002 PROPOFOL 10 MG/ML EMULSION: Performed by: NURSE ANESTHETIST, CERTIFIED REGISTERED

## 2020-02-03 PROCEDURE — 87075 CULTR BACTERIA EXCEPT BLOOD: CPT | Performed by: OBSTETRICS & GYNECOLOGY

## 2020-02-03 PROCEDURE — 25010000002 BUTORPHANOL PER 1 MG: Performed by: OBSTETRICS & GYNECOLOGY

## 2020-02-03 PROCEDURE — 25010000002 GENTAMICIN PER 80 MG: Performed by: OBSTETRICS & GYNECOLOGY

## 2020-02-03 PROCEDURE — 86850 RBC ANTIBODY SCREEN: CPT | Performed by: OBSTETRICS & GYNECOLOGY

## 2020-02-03 PROCEDURE — 85027 COMPLETE CBC AUTOMATED: CPT | Performed by: OBSTETRICS & GYNECOLOGY

## 2020-02-03 PROCEDURE — 76815 OB US LIMITED FETUS(S): CPT

## 2020-02-03 PROCEDURE — 88305 TISSUE EXAM BY PATHOLOGIST: CPT | Performed by: OBSTETRICS & GYNECOLOGY

## 2020-02-03 PROCEDURE — 87015 SPECIMEN INFECT AGNT CONCNTJ: CPT | Performed by: OBSTETRICS & GYNECOLOGY

## 2020-02-03 PROCEDURE — 25010000002 PROMETHAZINE PER 50 MG: Performed by: OBSTETRICS & GYNECOLOGY

## 2020-02-03 PROCEDURE — 86900 BLOOD TYPING SEROLOGIC ABO: CPT | Performed by: OBSTETRICS & GYNECOLOGY

## 2020-02-03 PROCEDURE — 10D17ZZ EXTRACTION OF PRODUCTS OF CONCEPTION, RETAINED, VIA NATURAL OR ARTIFICIAL OPENING: ICD-10-PCS | Performed by: OBSTETRICS & GYNECOLOGY

## 2020-02-03 PROCEDURE — 25010000002 MIDAZOLAM PER 1 MG: Performed by: NURSE ANESTHETIST, CERTIFIED REGISTERED

## 2020-02-03 PROCEDURE — 87070 CULTURE OTHR SPECIMN AEROBIC: CPT | Performed by: OBSTETRICS & GYNECOLOGY

## 2020-02-03 PROCEDURE — 25010000002 AMPICILLIN PER 500 MG: Performed by: OBSTETRICS & GYNECOLOGY

## 2020-02-03 PROCEDURE — 25010000002 FENTANYL CITRATE (PF) 100 MCG/2ML SOLUTION: Performed by: NURSE ANESTHETIST, CERTIFIED REGISTERED

## 2020-02-03 PROCEDURE — 87205 SMEAR GRAM STAIN: CPT | Performed by: OBSTETRICS & GYNECOLOGY

## 2020-02-03 RX ORDER — FLUMAZENIL 0.1 MG/ML
0.2 INJECTION INTRAVENOUS AS NEEDED
Status: CANCELLED | OUTPATIENT
Start: 2020-02-03

## 2020-02-03 RX ORDER — PROMETHAZINE HYDROCHLORIDE 25 MG/ML
12.5 INJECTION, SOLUTION INTRAMUSCULAR; INTRAVENOUS EVERY 6 HOURS PRN
Status: DISCONTINUED | OUTPATIENT
Start: 2020-02-03 | End: 2020-02-04 | Stop reason: HOSPADM

## 2020-02-03 RX ORDER — HYDRALAZINE HYDROCHLORIDE 20 MG/ML
5 INJECTION INTRAMUSCULAR; INTRAVENOUS
Status: CANCELLED | OUTPATIENT
Start: 2020-02-03

## 2020-02-03 RX ORDER — SODIUM CHLORIDE 0.9 % (FLUSH) 0.9 %
10 SYRINGE (ML) INJECTION AS NEEDED
Status: DISCONTINUED | OUTPATIENT
Start: 2020-02-03 | End: 2020-02-04

## 2020-02-03 RX ORDER — AMOXICILLIN 250 MG
1 CAPSULE ORAL 2 TIMES DAILY PRN
Status: DISCONTINUED | OUTPATIENT
Start: 2020-02-03 | End: 2020-02-04 | Stop reason: HOSPADM

## 2020-02-03 RX ORDER — METOCLOPRAMIDE HYDROCHLORIDE 5 MG/ML
5 INJECTION INTRAMUSCULAR; INTRAVENOUS
Status: CANCELLED | OUTPATIENT
Start: 2020-02-03

## 2020-02-03 RX ORDER — ONDANSETRON 2 MG/ML
4 INJECTION INTRAMUSCULAR; INTRAVENOUS EVERY 6 HOURS PRN
Status: DISCONTINUED | OUTPATIENT
Start: 2020-02-03 | End: 2020-02-04

## 2020-02-03 RX ORDER — OXYTOCIN/0.9 % SODIUM CHLORIDE 30/500 ML
999 PLASTIC BAG, INJECTION (ML) INTRAVENOUS ONCE
Status: COMPLETED | OUTPATIENT
Start: 2020-02-03 | End: 2020-02-03

## 2020-02-03 RX ORDER — OXYCODONE AND ACETAMINOPHEN 10; 325 MG/1; MG/1
1 TABLET ORAL ONCE AS NEEDED
Status: CANCELLED | OUTPATIENT
Start: 2020-02-03

## 2020-02-03 RX ORDER — PRENATAL VIT/IRON FUM/FOLIC AC 27MG-0.8MG
1 TABLET ORAL DAILY
Status: DISCONTINUED | OUTPATIENT
Start: 2020-02-04 | End: 2020-02-04 | Stop reason: HOSPADM

## 2020-02-03 RX ORDER — MIDAZOLAM HYDROCHLORIDE 1 MG/ML
INJECTION INTRAMUSCULAR; INTRAVENOUS AS NEEDED
Status: DISCONTINUED | OUTPATIENT
Start: 2020-02-03 | End: 2020-02-03 | Stop reason: SURG

## 2020-02-03 RX ORDER — FENTANYL CITRATE 50 UG/ML
INJECTION, SOLUTION INTRAMUSCULAR; INTRAVENOUS AS NEEDED
Status: DISCONTINUED | OUTPATIENT
Start: 2020-02-03 | End: 2020-02-03 | Stop reason: SURG

## 2020-02-03 RX ORDER — LABETALOL HYDROCHLORIDE 5 MG/ML
5 INJECTION, SOLUTION INTRAVENOUS
Status: CANCELLED | OUTPATIENT
Start: 2020-02-03

## 2020-02-03 RX ORDER — FENTANYL CITRATE 50 UG/ML
25 INJECTION, SOLUTION INTRAMUSCULAR; INTRAVENOUS AS NEEDED
Status: CANCELLED | OUTPATIENT
Start: 2020-02-03

## 2020-02-03 RX ORDER — OXYCODONE HYDROCHLORIDE AND ACETAMINOPHEN 5; 325 MG/1; MG/1
1 TABLET ORAL EVERY 6 HOURS PRN
Status: DISCONTINUED | OUTPATIENT
Start: 2020-02-03 | End: 2020-02-04 | Stop reason: HOSPADM

## 2020-02-03 RX ORDER — OXYTOCIN/0.9 % SODIUM CHLORIDE 30/500 ML
250 PLASTIC BAG, INJECTION (ML) INTRAVENOUS CONTINUOUS
Status: DISPENSED | OUTPATIENT
Start: 2020-02-03 | End: 2020-02-03

## 2020-02-03 RX ORDER — NALOXONE HCL 0.4 MG/ML
0.04 VIAL (ML) INJECTION AS NEEDED
Status: CANCELLED | OUTPATIENT
Start: 2020-02-03

## 2020-02-03 RX ORDER — BUTORPHANOL TARTRATE 1 MG/ML
1 INJECTION, SOLUTION INTRAMUSCULAR; INTRAVENOUS
Status: DISCONTINUED | OUTPATIENT
Start: 2020-02-03 | End: 2020-02-04

## 2020-02-03 RX ORDER — IPRATROPIUM BROMIDE AND ALBUTEROL SULFATE 2.5; .5 MG/3ML; MG/3ML
3 SOLUTION RESPIRATORY (INHALATION) ONCE AS NEEDED
Status: CANCELLED | OUTPATIENT
Start: 2020-02-03

## 2020-02-03 RX ORDER — OXYTOCIN/0.9 % SODIUM CHLORIDE 30/500 ML
250 PLASTIC BAG, INJECTION (ML) INTRAVENOUS CONTINUOUS
Status: ACTIVE | OUTPATIENT
Start: 2020-02-03 | End: 2020-02-03

## 2020-02-03 RX ORDER — BISACODYL 5 MG/1
10 TABLET, DELAYED RELEASE ORAL DAILY PRN
Status: DISCONTINUED | OUTPATIENT
Start: 2020-02-03 | End: 2020-02-04 | Stop reason: HOSPADM

## 2020-02-03 RX ORDER — MISOPROSTOL 200 UG/1
800 TABLET ORAL ONCE
Status: COMPLETED | OUTPATIENT
Start: 2020-02-03 | End: 2020-02-03

## 2020-02-03 RX ORDER — METHYLERGONOVINE MALEATE 0.2 MG/ML
200 INJECTION INTRAVENOUS ONCE AS NEEDED
Status: DISCONTINUED | OUTPATIENT
Start: 2020-02-03 | End: 2020-02-04 | Stop reason: HOSPADM

## 2020-02-03 RX ORDER — KETAMINE HYDROCHLORIDE 50 MG/ML
INJECTION, SOLUTION, CONCENTRATE INTRAMUSCULAR; INTRAVENOUS AS NEEDED
Status: DISCONTINUED | OUTPATIENT
Start: 2020-02-03 | End: 2020-02-03 | Stop reason: SURG

## 2020-02-03 RX ORDER — MISOPROSTOL 200 UG/1
400 TABLET ORAL ONCE
Status: COMPLETED | OUTPATIENT
Start: 2020-02-03 | End: 2020-02-03

## 2020-02-03 RX ORDER — MISOPROSTOL 200 UG/1
800 TABLET ORAL ONCE AS NEEDED
Status: DISCONTINUED | OUTPATIENT
Start: 2020-02-03 | End: 2020-02-04 | Stop reason: HOSPADM

## 2020-02-03 RX ORDER — CARBOPROST TROMETHAMINE 250 UG/ML
250 INJECTION, SOLUTION INTRAMUSCULAR AS NEEDED
Status: DISCONTINUED | OUTPATIENT
Start: 2020-02-03 | End: 2020-02-04 | Stop reason: HOSPADM

## 2020-02-03 RX ORDER — PROMETHAZINE HYDROCHLORIDE 25 MG/1
25 TABLET ORAL EVERY 6 HOURS PRN
Status: DISCONTINUED | OUTPATIENT
Start: 2020-02-03 | End: 2020-02-04 | Stop reason: HOSPADM

## 2020-02-03 RX ORDER — BISACODYL 10 MG
10 SUPPOSITORY, RECTAL RECTAL DAILY PRN
Status: DISCONTINUED | OUTPATIENT
Start: 2020-02-03 | End: 2020-02-04 | Stop reason: HOSPADM

## 2020-02-03 RX ORDER — ONDANSETRON 4 MG/1
4 TABLET, FILM COATED ORAL EVERY 6 HOURS PRN
Status: DISCONTINUED | OUTPATIENT
Start: 2020-02-03 | End: 2020-02-04

## 2020-02-03 RX ORDER — SODIUM CHLORIDE 0.9 % (FLUSH) 0.9 %
3 SYRINGE (ML) INJECTION EVERY 12 HOURS SCHEDULED
Status: DISCONTINUED | OUTPATIENT
Start: 2020-02-03 | End: 2020-02-04

## 2020-02-03 RX ORDER — ONDANSETRON 2 MG/ML
4 INJECTION INTRAMUSCULAR; INTRAVENOUS AS NEEDED
Status: CANCELLED | OUTPATIENT
Start: 2020-02-03 | End: 2020-02-03

## 2020-02-03 RX ORDER — SODIUM CHLORIDE 0.9 % (FLUSH) 0.9 %
3 SYRINGE (ML) INJECTION EVERY 12 HOURS SCHEDULED
Status: DISCONTINUED | OUTPATIENT
Start: 2020-02-03 | End: 2020-02-04 | Stop reason: HOSPADM

## 2020-02-03 RX ORDER — PROMETHAZINE HYDROCHLORIDE 12.5 MG/1
12.5 SUPPOSITORY RECTAL EVERY 6 HOURS PRN
Status: DISCONTINUED | OUTPATIENT
Start: 2020-02-03 | End: 2020-02-04 | Stop reason: HOSPADM

## 2020-02-03 RX ORDER — OXYTOCIN/0.9 % SODIUM CHLORIDE 30/500 ML
999 PLASTIC BAG, INJECTION (ML) INTRAVENOUS ONCE
Status: DISCONTINUED | OUTPATIENT
Start: 2020-02-03 | End: 2020-02-04 | Stop reason: HOSPADM

## 2020-02-03 RX ORDER — LIDOCAINE HYDROCHLORIDE 10 MG/ML
5 INJECTION, SOLUTION EPIDURAL; INFILTRATION; INTRACAUDAL; PERINEURAL AS NEEDED
Status: DISCONTINUED | OUTPATIENT
Start: 2020-02-03 | End: 2020-02-04

## 2020-02-03 RX ORDER — CALCIUM CARBONATE 200(500)MG
1 TABLET,CHEWABLE ORAL EVERY 6 HOURS PRN
Status: DISCONTINUED | OUTPATIENT
Start: 2020-02-03 | End: 2020-02-04 | Stop reason: HOSPADM

## 2020-02-03 RX ORDER — MORPHINE SULFATE 2 MG/ML
2 INJECTION, SOLUTION INTRAMUSCULAR; INTRAVENOUS
Status: CANCELLED | OUTPATIENT
Start: 2020-02-03 | End: 2020-02-04

## 2020-02-03 RX ORDER — ONDANSETRON 2 MG/ML
4 INJECTION INTRAMUSCULAR; INTRAVENOUS EVERY 6 HOURS PRN
Status: DISCONTINUED | OUTPATIENT
Start: 2020-02-03 | End: 2020-02-04 | Stop reason: HOSPADM

## 2020-02-03 RX ORDER — SODIUM CHLORIDE, SODIUM LACTATE, POTASSIUM CHLORIDE, CALCIUM CHLORIDE 600; 310; 30; 20 MG/100ML; MG/100ML; MG/100ML; MG/100ML
125 INJECTION, SOLUTION INTRAVENOUS CONTINUOUS
Status: DISCONTINUED | OUTPATIENT
Start: 2020-02-03 | End: 2020-02-04 | Stop reason: HOSPADM

## 2020-02-03 RX ORDER — PROPOFOL 10 MG/ML
VIAL (ML) INTRAVENOUS AS NEEDED
Status: DISCONTINUED | OUTPATIENT
Start: 2020-02-03 | End: 2020-02-03 | Stop reason: SURG

## 2020-02-03 RX ORDER — CARBOPROST TROMETHAMINE 250 UG/ML
250 INJECTION, SOLUTION INTRAMUSCULAR
Status: DISCONTINUED | OUTPATIENT
Start: 2020-02-03 | End: 2020-02-04 | Stop reason: HOSPADM

## 2020-02-03 RX ORDER — SODIUM CHLORIDE 0.9 % (FLUSH) 0.9 %
3-10 SYRINGE (ML) INJECTION AS NEEDED
Status: DISCONTINUED | OUTPATIENT
Start: 2020-02-03 | End: 2020-02-04 | Stop reason: HOSPADM

## 2020-02-03 RX ORDER — OXYTOCIN/0.9 % SODIUM CHLORIDE 30/500 ML
125 PLASTIC BAG, INJECTION (ML) INTRAVENOUS CONTINUOUS PRN
Status: DISCONTINUED | OUTPATIENT
Start: 2020-02-03 | End: 2020-02-04 | Stop reason: HOSPADM

## 2020-02-03 RX ORDER — METHYLERGONOVINE MALEATE 0.2 MG/ML
200 INJECTION INTRAVENOUS
Status: DISCONTINUED | OUTPATIENT
Start: 2020-02-03 | End: 2020-02-04 | Stop reason: HOSPADM

## 2020-02-03 RX ORDER — ONDANSETRON 4 MG/1
4 TABLET, FILM COATED ORAL EVERY 6 HOURS PRN
Status: DISCONTINUED | OUTPATIENT
Start: 2020-02-03 | End: 2020-02-04 | Stop reason: HOSPADM

## 2020-02-03 RX ORDER — MISOPROSTOL 200 UG/1
800 TABLET ORAL AS NEEDED
Status: DISCONTINUED | OUTPATIENT
Start: 2020-02-03 | End: 2020-02-04 | Stop reason: HOSPADM

## 2020-02-03 RX ORDER — SODIUM CHLORIDE, SODIUM LACTATE, POTASSIUM CHLORIDE, CALCIUM CHLORIDE 600; 310; 30; 20 MG/100ML; MG/100ML; MG/100ML; MG/100ML
125 INJECTION, SOLUTION INTRAVENOUS CONTINUOUS
Status: DISCONTINUED | OUTPATIENT
Start: 2020-02-03 | End: 2020-02-04

## 2020-02-03 RX ORDER — IBUPROFEN 800 MG/1
800 TABLET ORAL EVERY 8 HOURS PRN
Status: DISCONTINUED | OUTPATIENT
Start: 2020-02-03 | End: 2020-02-04 | Stop reason: HOSPADM

## 2020-02-03 RX ORDER — DOCUSATE SODIUM 100 MG/1
100 CAPSULE, LIQUID FILLED ORAL 2 TIMES DAILY PRN
Status: DISCONTINUED | OUTPATIENT
Start: 2020-02-03 | End: 2020-02-04 | Stop reason: HOSPADM

## 2020-02-03 RX ADMIN — BUTORPHANOL TARTRATE 1 MG: 1 INJECTION, SOLUTION INTRAMUSCULAR; INTRAVENOUS at 08:38

## 2020-02-03 RX ADMIN — FENTANYL CITRATE 100 MCG: 50 INJECTION, SOLUTION INTRAMUSCULAR; INTRAVENOUS at 09:37

## 2020-02-03 RX ADMIN — IBUPROFEN 800 MG: 800 TABLET, FILM COATED ORAL at 20:13

## 2020-02-03 RX ADMIN — MISOPROSTOL 400 MCG: 200 TABLET ORAL at 07:15

## 2020-02-03 RX ADMIN — SODIUM CHLORIDE, POTASSIUM CHLORIDE, SODIUM LACTATE AND CALCIUM CHLORIDE 125 ML/HR: 600; 310; 30; 20 INJECTION, SOLUTION INTRAVENOUS at 13:10

## 2020-02-03 RX ADMIN — PROMETHAZINE HYDROCHLORIDE 12.5 MG: 25 INJECTION INTRAMUSCULAR; INTRAVENOUS at 22:43

## 2020-02-03 RX ADMIN — SODIUM CHLORIDE, POTASSIUM CHLORIDE, SODIUM LACTATE AND CALCIUM CHLORIDE 125 ML/HR: 600; 310; 30; 20 INJECTION, SOLUTION INTRAVENOUS at 04:26

## 2020-02-03 RX ADMIN — AMPICILLIN 2 G: 2 INJECTION, POWDER, FOR SOLUTION INTRAVENOUS at 14:34

## 2020-02-03 RX ADMIN — KETAMINE HYDROCHLORIDE 50 MG: 50 INJECTION, SOLUTION INTRAMUSCULAR; INTRAVENOUS at 09:37

## 2020-02-03 RX ADMIN — OXYTOCIN-SODIUM CHLORIDE 0.9% IV SOLN 30 UNIT/500ML 999 ML/HR: 30-0.9/5 SOLUTION at 10:52

## 2020-02-03 RX ADMIN — MIDAZOLAM 2 MG: 1 INJECTION INTRAMUSCULAR; INTRAVENOUS at 09:37

## 2020-02-03 RX ADMIN — GENTAMICIN SULFATE 220 MG: 40 INJECTION, SOLUTION INTRAMUSCULAR; INTRAVENOUS at 08:15

## 2020-02-03 RX ADMIN — AMPICILLIN 2 G: 2 INJECTION, POWDER, FOR SOLUTION INTRAVENOUS at 06:50

## 2020-02-03 RX ADMIN — MISOPROSTOL 800 MCG: 200 TABLET ORAL at 08:52

## 2020-02-03 RX ADMIN — AMPICILLIN 2 G: 2 INJECTION, POWDER, FOR SOLUTION INTRAVENOUS at 20:13

## 2020-02-03 RX ADMIN — BUTORPHANOL TARTRATE 1 MG: 1 INJECTION, SOLUTION INTRAMUSCULAR; INTRAVENOUS at 06:45

## 2020-02-03 RX ADMIN — PROPOFOL 100 MG: 10 INJECTION, EMULSION INTRAVENOUS at 09:37

## 2020-02-03 RX ADMIN — GENTAMICIN SULFATE 220 MG: 40 INJECTION, SOLUTION INTRAMUSCULAR; INTRAVENOUS at 17:43

## 2020-02-03 NOTE — PLAN OF CARE
Problem: Patient Care Overview  Goal: Plan of Care Review  Outcome: Ongoing (interventions implemented as appropriate)  Flowsheets (Taken 2/3/2020 9926)  Progress: no change  Plan of Care Reviewed With: patient; spouse  Outcome Summary:  with  death and unplanned D&C with suction after delivery of placenta. Pt on antibiotics and LR at 125. Regular diet. Baby to remain in morgue at [facility] upon maternal discharge.

## 2020-02-03 NOTE — NURSING NOTE
Patient to labor and delivery complaining of vaginal bleeding and contractions.  Melissa Johnson RN

## 2020-02-03 NOTE — ANESTHESIA POSTPROCEDURE EVALUATION
"Patient: Jyotsna Peterson    Procedure Summary     Date:  02/03/20 Room / Location:  [facility] LABOR DELIVERY 2 /  [facility] LABOR DELIVERY    Anesthesia Start:  0933 Anesthesia Stop:  1001    Procedure:  DILATATION AND CURETTAGE (N/A Vagina) Diagnosis:      Surgeon:  Ca Ying MD Provider:  Dickson Leary CRNA    Anesthesia Type:  MAC ASA Status:  2 - Emergent          Anesthesia Type: No value filed.    Vitals  Vitals Value Taken Time   BP     Temp     Pulse 89 2/3/2020 10:00 AM   Resp     SpO2 98 % 2/3/2020 10:00 AM   Vitals shown include unvalidated device data.        Post Anesthesia Care and Evaluation    Patient location during evaluation: PACU  Patient participation: complete - patient participated  Level of consciousness: awake and alert  Pain management: adequate  Airway patency: patent  Anesthetic complications: No anesthetic complications    Cardiovascular status: acceptable  Respiratory status: acceptable  Hydration status: acceptable    Comments: Blood pressure 97/56, pulse 86, temperature 98.4 °F (36.9 °C), temperature source Oral, resp. rate 18, height 162.6 cm (64\"), weight 96.2 kg (212 lb).    Pt discharged from PACU based on esdras score >8      "

## 2020-02-03 NOTE — ANESTHESIA PREPROCEDURE EVALUATION
Anesthesia Evaluation     Patient summary reviewed and Nursing notes reviewed   no history of anesthetic complications:  NPO Solid Status: Waived due to emergency  NPO Liquid Status: Waived due to emergency           Airway   Mallampati: II  No difficulty expected  Dental - normal exam     Pulmonary - negative pulmonary ROS   Cardiovascular - negative cardio ROS  Exercise tolerance: good (4-7 METS)        Neuro/Psych- negative ROS  GI/Hepatic/Renal/Endo - negative ROS     Musculoskeletal (-) negative ROS    Abdominal    Substance History - negative use     OB/GYN    (+) Pregnant,         Other        ROS/Med Hx Other: MAC for retrieval of placenta and bleeding cessation                   Anesthesia Plan    ASA 2 - emergent     MAC     intravenous induction     Anesthetic plan, all risks, benefits, and alternatives have been provided, discussed and informed consent has been obtained with: patient and spouse/significant other.

## 2020-02-03 NOTE — LACTATION NOTE
Suppression Teaching    Discussed with pt how breasts will make milk since she has delivered. Offered options of:  pumping and donating milk, or suppressing milk. Educated on milk suppression (tight fitting bra for 10-14 days; day/night; no nipple stimulation, measures for engorgement.) Mom did not voice either way what her plan will be. Mom dealing with trauma of demise. Emotional support given.

## 2020-02-03 NOTE — OP NOTE
Suction D&C Procedure Note    Indications: Retained placenta, postpartum hemorrhage    Pre-operative Diagnosis: Retained placenta, postpartum hemorrhage    Post-operative Diagnosis: Retained placenta, postpartum hemorrhage    Planned Procedure:  Suction D&C                                            Surgeon: Ca Ying MD     Assistants: None    Anesthesia: IV sedation/MAC    ASA Class: 1      Indication:   INDICATIONS: The patient is a 24-year-old female at 20 weeks by her last menstrual period and ultrasound who delivered the baby spontaneous after  PPROM 2.5 weeks ago.  Placenta had not yet delivered despite cytotec when patient began bleeding much heavier, decision made to proceed with D&C    The patient and spouse were described the procedure in detail including risks of infection, bleeding, injury to surrounding organs including risk of perforation. Informed consent was obtained prior to proceeding with the procedure.    Procedure:  The patient was taken to the operating room where sedation & MAC was administered without difficulty. The patient was prepped and draped in usual sterile fashion in lithotomy position. A single-sided speculum was placed. The anterior lip of the cervix was grasped with a single tooth tenaculum. At this time, a 10-mm suction curettage was advanced into the uterine cavity without difficulty and was used to suction contents of the uterus. Following removal of the contents, a sharp curette was advanced into the uterine cavity and was used to scrape the four walls of the uterus until a gritty texture was noted. At this time, the suction curette was advanced one additional time to suction any remaining products. All instruments were removed. Hemostasis was visualized. The patient was stable at the completion of the procedure. Sponge, lap, and instrument counts were correct.     Estimated Blood Loss:  100ml             Drains: 100ml           Total IV Fluids: 500ml            Specimens: Uterine contents           Complications:  None; patient tolerated the procedure well.           Disposition: PACU - hemodynamically stable.           Condition: stable    Ca Ying MD  02/03/20  10:01 AM

## 2020-02-03 NOTE — L&D DELIVERY NOTE
[facility]  Vaginal Delivery Note    Delivery details     Delivery: Vaginal, Spontaneous     YOB: 2021    Time of Birth: 8:43 AM      Anesthesia: None     Delivering clinician: Ca Ying    Forceps?   No   Vacuum? No    Shoulder dystocia present: No      Delivery narrative:   vaginal delivery    Infant details    Findings: male  infant     Infant observations: Weight: 330 g (11.6 oz)   Length: 10.5  in   Apgars: 2   @ 1 minute /    2   @ 5 minutes     Placenta, Cord, and Fluid    Placenta delivered  Spontaneous  at   2/3/2020  9:22 AM     Cord: 3 vessels  present.   Nuchal Cord?  no   Cord blood obtained: No      Repair    Episiotomy: None    Lacerations: No   Estimated Blood Loss: Est. Blood Loss (mL): 125 mL(Filed from Delivery Summary) (20 0843)   Suture used for repair: n/a       Complications  Retained placenta/clot    Disposition  Mother to Mother Baby/Postpartum  in stable condition currently.  Baby to remain with mom  in critical condition currently - previable.      Ca Ying MD  20  8:41 AM

## 2020-02-03 NOTE — PROGRESS NOTES
Christiano Peterson  : 1995  MRN: [id number]  CSN: [id number]    Labor progress note    Subjective   She reports is feeling painful contractions.  PPROM at 17 weeks who had wanted expectant management presented this morning in labor 4cm dilated     Objective   Min/max vitals past 24 hours:  Temp  Min: 97.8 °F (36.6 °C)  Max: 98.4 °F (36.9 °C)   BP  Min: 111/63  Max: 119/71   Pulse  Min: 82  Max: 113   Resp  Min: 16  Max: 18        FHT's: presest   Cervix: was checked (by RN): 4 cm / 90 % / -2   Contractions: irregular      Assessment   1. IUP at 20w1d  2. Labor     Plan   1. Expectant management    Ca Ying MD  2/3/2020  6:56 AM

## 2020-02-03 NOTE — LACTATION NOTE
"Bereavement Note    Consulted with pt and , Herb.  Pt tearful holding baby in arms. Mom and Dad have not yet begun to inform family and their own clergy about demise of infant. Encouraged them to share as they wished, in their own way/time. They are very involved in own [employer] in [place], [place].  Emotional/spiritual support extended. Mother voiced, \"We were hanging by a thread.\"  Reported she had an abruption some days ago and began cramping last night. Mom looking at pics of baby taken by nurse. Informed her of Memory Box she would be given. Bereavement contact card given.   "

## 2020-02-04 VITALS
BODY MASS INDEX: 36.19 KG/M2 | HEIGHT: 64 IN | TEMPERATURE: 97.7 F | DIASTOLIC BLOOD PRESSURE: 71 MMHG | SYSTOLIC BLOOD PRESSURE: 109 MMHG | HEART RATE: 75 BPM | WEIGHT: 212 LBS | RESPIRATION RATE: 18 BRPM | OXYGEN SATURATION: 98 %

## 2020-02-04 LAB
BASOPHILS # BLD AUTO: 0.02 10*3/MM3 (ref 0–0.2)
BASOPHILS NFR BLD AUTO: 0.3 % (ref 0–1.5)
DEPRECATED RDW RBC AUTO: 41.1 FL (ref 37–54)
EOSINOPHIL # BLD AUTO: 0.16 10*3/MM3 (ref 0–0.4)
EOSINOPHIL NFR BLD AUTO: 2.2 % (ref 0.3–6.2)
ERYTHROCYTE [DISTWIDTH] IN BLOOD BY AUTOMATED COUNT: 13.1 % (ref 12.3–15.4)
HCT VFR BLD AUTO: 28.1 % (ref 34–46.6)
HGB BLD-MCNC: 9.6 G/DL (ref 12–15.9)
IMM GRANULOCYTES # BLD AUTO: 0.02 10*3/MM3 (ref 0–0.05)
IMM GRANULOCYTES NFR BLD AUTO: 0.3 % (ref 0–0.5)
LYMPHOCYTES # BLD AUTO: 1.7 10*3/MM3 (ref 0.7–3.1)
LYMPHOCYTES NFR BLD AUTO: 23.7 % (ref 19.6–45.3)
MCH RBC QN AUTO: 30 PG (ref 26.6–33)
MCHC RBC AUTO-ENTMCNC: 34.2 G/DL (ref 31.5–35.7)
MCV RBC AUTO: 87.8 FL (ref 79–97)
MONOCYTES # BLD AUTO: 0.43 10*3/MM3 (ref 0.1–0.9)
MONOCYTES NFR BLD AUTO: 6 % (ref 5–12)
NEUTROPHILS # BLD AUTO: 4.83 10*3/MM3 (ref 1.7–7)
NEUTROPHILS NFR BLD AUTO: 67.5 % (ref 42.7–76)
NRBC BLD AUTO-RTO: 0 /100 WBC (ref 0–0.2)
PLATELET # BLD AUTO: 176 10*3/MM3 (ref 140–450)
PMV BLD AUTO: 10.4 FL (ref 6–12)
RBC # BLD AUTO: 3.2 10*6/MM3 (ref 3.77–5.28)
WBC NRBC COR # BLD: 7.16 10*3/MM3 (ref 3.4–10.8)

## 2020-02-04 PROCEDURE — 25010000002 AMPICILLIN PER 500 MG: Performed by: OBSTETRICS & GYNECOLOGY

## 2020-02-04 PROCEDURE — 25010000002 GENTAMICIN PER 80 MG: Performed by: OBSTETRICS & GYNECOLOGY

## 2020-02-04 PROCEDURE — 85025 COMPLETE CBC W/AUTO DIFF WBC: CPT | Performed by: OBSTETRICS & GYNECOLOGY

## 2020-02-04 PROCEDURE — 94799 UNLISTED PULMONARY SVC/PX: CPT

## 2020-02-04 RX ORDER — IBUPROFEN 800 MG/1
800 TABLET ORAL EVERY 8 HOURS PRN
Qty: 90 TABLET | Refills: 2 | Status: SHIPPED | OUTPATIENT
Start: 2020-02-04 | End: 2020-05-26

## 2020-02-04 RX ADMIN — SODIUM CHLORIDE, POTASSIUM CHLORIDE, SODIUM LACTATE AND CALCIUM CHLORIDE 125 ML/HR: 600; 310; 30; 20 INJECTION, SOLUTION INTRAVENOUS at 00:39

## 2020-02-04 RX ADMIN — AMPICILLIN 2 G: 2 INJECTION, POWDER, FOR SOLUTION INTRAVENOUS at 04:56

## 2020-02-04 RX ADMIN — GENTAMICIN SULFATE 220 MG: 40 INJECTION, SOLUTION INTRAMUSCULAR; INTRAVENOUS at 02:03

## 2020-02-04 NOTE — PLAN OF CARE
20w1d  demise.  PPROM at 17w4d.  Unplanned suction D&C   Continued antibiotics and LR at 125 ml/hr.

## 2020-02-04 NOTE — PROGRESS NOTES
"[facility]  Vaginal Delivery Progress Note    Subjective   Postpartum Day 1: Vaginal Delivery of nonviable fetus    The patient feels well.  Her pain is well controlled with prescribed pain medications.   She is ambulating well.  Patient describes her bleeding as thin lochia.    Breastfeeding: n/a - fetal demise    Objective     Vital Signs Range for the last 24 hours  Temperature: Temp:  [98.2 °F (36.8 °C)-100.4 °F (38 °C)] 98.2 °F (36.8 °C)   Temp Source: Temp src: Temporal   BP: BP: ()/(55-74) 118/74   Pulse: Heart Rate:  [] 91   Respirations: Resp:  [16] 16   SPO2: SpO2:  [97 %-100 %] 98 %   O2 Amount (l/min): Flow (L/min):  [5-7] 5   O2 Devices Device (Oxygen Therapy): room air   Weight:       Admit Height:  Height: 162.6 cm (64\")      Physical Exam:  General:  no acute distresss.  Abdomen: Fundus: appropriate, firm, non tender  Extremities: normal, atraumatic, no cyanosis, and trace edema.     Lab results reviewed:  Yes   Rubella:  No results found for: RUBELLAIGGIN Nurse Transcribed from prenatal record --  No components found for: EXTRUBELQUAL  Rh Status:    RH type   Date Value Ref Range Status   02/03/2020 Positive  Final     Immunizations:   Immunization History   Administered Date(s) Administered   • influenza Split 03/16/2017       Assessment/Plan       * No active hospital problems. *      Jyotsna Peterson is Day 1  post-partum  Vaginal, Spontaneous    .      Plan:  Discharge home with standard precautions and return to clinic in 4-6 weeks.      Ca Ying MD  2/4/2020  8:42 AM  "

## 2020-02-05 NOTE — PAYOR COMM NOTE
"[facility] HOME 2-4-20  [id number]    [phone]    Jyotsna Stern (24 y.o. Female)     Date of Birth Social Security Number Address Home Phone MRN    1995  [address] [place] [address] [phone] [id number]    Religious Marital Status          Mosque        Admission Date Admission Type Admitting Provider Attending Provider Department, Room/Bed    2/3/20 Elective Ca Ying MD  Psychiatric LABOR DELIVERY, L04/1    Discharge Date Discharge Disposition Discharge Destination        2/4/2020 Home or Self Care              Attending Provider:  (none)   Allergies:  No Known Allergies    Isolation:  None   Infection:  None   Code Status:  Prior    Ht:  162.6 cm (64\")   Wt:  96.2 kg (212 lb)    Admission Cmt:  None   Principal Problem:  None                Active Insurance as of 2/3/2020     Primary Coverage     Payor Plan Insurance Group Employer/Plan Group    ANTHEM BLUE CROSS ANTHEM BLUE CROSS BLUE SHIELD PPO [id number]     Payor Plan Address Payor Plan Phone Number Payor Plan Fax Number Effective Dates    [address] [phone]  11/3/2018 - None Entered    [facility] [id number]       Subscriber Name Subscriber Birth Date Member ID       BRIE STERN 6/20/1990 [id number]                 Emergency Contacts      (Rel.) Home Phone Work Phone Mobile Phone    BRIE STERN (Spouse) [phone] -- --               Operative/Procedure Notes (all)      Ca Ying MD at 02/03/20 0856  Version 1 of 1       Suction D&C Procedure Note    Indications: Retained placenta, postpartum hemorrhage    Pre-operative Diagnosis: Retained placenta, postpartum hemorrhage    Post-operative Diagnosis: Retained placenta, postpartum hemorrhage    Planned Procedure:  Suction D&C                                            Surgeon: Ca Ying MD     Assistants: None    Anesthesia: IV sedation/MAC    ASA Class: 1      Indication: "   INDICATIONS: The patient is a 24-year-old female at 20 weeks by her last menstrual period and ultrasound who delivered the baby spontaneous after  PPROM 2.5 weeks ago.  Placenta had not yet delivered despite cytotec when patient began bleeding much heavier, decision made to proceed with D&C    The patient and spouse were described the procedure in detail including risks of infection, bleeding, injury to surrounding organs including risk of perforation. Informed consent was obtained prior to proceeding with the procedure.    Procedure:  The patient was taken to the operating room where sedation & MAC was administered without difficulty. The patient was prepped and draped in usual sterile fashion in lithotomy position. A single-sided speculum was placed. The anterior lip of the cervix was grasped with a single tooth tenaculum. At this time, a 10-mm suction curettage was advanced into the uterine cavity without difficulty and was used to suction contents of the uterus. Following removal of the contents, a sharp curette was advanced into the uterine cavity and was used to scrape the four walls of the uterus until a gritty texture was noted. At this time, the suction curette was advanced one additional time to suction any remaining products. All instruments were removed. Hemostasis was visualized. The patient was stable at the completion of the procedure. Sponge, lap, and instrument counts were correct.     Estimated Blood Loss:  100ml             Drains: 100ml           Total IV Fluids: 500ml           Specimens: Uterine contents           Complications:  None; patient tolerated the procedure well.           Disposition: PACU - hemodynamically stable.           Condition: stable    Ca Ying MD  02/03/20  10:01 AM    Electronically signed by Ca Ying MD at 02/03/20 1002     Ca Ying MD at 02/03/20 1663  Version 1 of 1         [facility]  V[facility]    Delivery  details     Delivery: Vaginal, Spontaneous     YOB: 2021    Time of Birth: 8:43 AM      Anesthesia: None     Delivering clinician: Ca Ying    Forceps?   No   Vacuum? No    Shoulder dystocia present: No      Delivery narrative:   vaginal delivery    Infant details    Findings: male  infant     Infant observations: Weight: 330 g (11.6 oz)   Length: 10.5  in   Apgars: 2   @ 1 minute /    2   @ 5 minutes     Placenta, Cord, and Fluid    Placenta delivered  Spontaneous  at   2/3/2020  9:22 AM     Cord: 3 vessels  present.   Nuchal Cord?  no   Cord blood obtained: No      Repair    Episiotomy: None    Lacerations: No   Estimated Blood Loss: Est. Blood Loss (mL): 125 mL(Filed from Delivery Summary) (20 0843)   Suture used for repair: n/a       Complications  Retained placenta/clot    Disposition  Mother to Mother Baby/Postpartum  in stable condition currently.  Baby to remain with mom  in critical condition currently - previable.      Ca Ying MD  20  8:41 AM    Electronically signed by Ca Ying MD at 20 0841          Discharge Summary      Ca Ying MD at 20 0844          Discharge Summary     Christiano Peterson  : 1995  MRN: [id number]  CSN: [id number]    Date of Admission: 2/3/2020   Date of Discharge:  2020   Delivering Physician: Ca Ying        Admission Diagnosis: 1.  premature rupture of membranes (PPROM) with unknown onset of labor [O42.919]  2. History of  premature rupture of membranes (PPROM) [Z87.59]  3.  premature rupture of membranes (PPROM) delivered, current hospitalization [O42.919]   Discharge Diagnosis: 1. Pregnancy at 20w1d - delivered       Procedures: 2/3/2020  - Vaginal, Spontaneous  - fetal death     Hospital Course  Patient is a 24 y.o.  who at 20w1d had a uncomplicated vaginal delivery.  Her postpartum course was without  complications.  On PPD #2 she was ready for discharge.  She had normal lochia and pain was well controlled with oral medications.    Infant  male  fetus weighing 330 g (11.6 oz)   Apgars -  2  @ 1 minute /  2  @ 5 minutes.    Discharge labs  Lab Results   Component Value Date    WBC 7.16 02/04/2020    HGB 9.6 (L) 02/04/2020    HCT 28.1 (L) 02/04/2020     02/04/2020       Discharge Medications     Discharge Medications      New Medications      Instructions Start Date   ibuprofen 800 MG tablet  Commonly known as:  ADVIL,MOTRIN   800 mg, Oral, Every 8 Hours PRN         Continue These Medications      Instructions Start Date   prenatal vitamin 27-0.8 27-0.8 MG tablet tablet   1 tablet, Oral, Daily             Discharge Disposition Home or Self Care   Condition on Discharge: good   Follow-up: 4 weeks with Dr. Ying, if no appointment, please call office     Ca Ying MD  2/4/2020    Electronically signed by Ca Ying MD at 02/04/20 7194

## 2020-02-06 LAB
BACTERIA SPEC AEROBE CULT: NORMAL
GRAM STN SPEC: NORMAL
GRAM STN SPEC: NORMAL

## 2020-02-07 LAB
CYTO UR: NORMAL
LAB AP CASE REPORT: NORMAL
LAB AP CLINICAL INFORMATION: NORMAL
PATH REPORT.FINAL DX SPEC: NORMAL
PATH REPORT.GROSS SPEC: NORMAL

## 2020-02-08 LAB — BACTERIA SPEC ANAEROBE CULT: NORMAL

## 2020-05-26 ENCOUNTER — HOSPITAL ENCOUNTER (OUTPATIENT)
Dept: GENERAL RADIOLOGY | Facility: HOSPITAL | Age: 25
Discharge: HOME | End: 2020-05-26
Admitting: NURSE PRACTITIONER

## 2020-05-26 PROCEDURE — U0003 INFECTIOUS AGENT DETECTION BY NUCLEIC ACID (DNA OR RNA); SEVERE ACUTE RESPIRATORY SYNDROME CORONAVIRUS 2 (SARS-COV-2) (CORONAVIRUS DISEASE [COVID-19]), AMPLIFIED PROBE TECHNIQUE, MAKING USE OF HIGH THROUGHPUT TECHNOLOGIES AS DESCRIBED BY CMS-2020-01-R: HCPCS | Performed by: NURSE PRACTITIONER

## 2020-05-26 PROCEDURE — 71046 X-RAY EXAM CHEST 2 VIEWS: CPT

## 2020-05-27 ENCOUNTER — TELEPHONE (OUTPATIENT)
Dept: URGENT CARE | Facility: CLINIC | Age: 25
End: 2020-05-27

## 2024-01-08 ENCOUNTER — OFFICE VISIT (OUTPATIENT)
Dept: INTERNAL MEDICINE | Facility: CLINIC | Age: 29
End: 2024-01-08

## 2024-01-08 ENCOUNTER — HOSPITAL ENCOUNTER (OUTPATIENT)
Dept: ULTRASOUND IMAGING | Facility: HOSPITAL | Age: 29
Discharge: HOME | End: 2024-01-08
Admitting: INTERNAL MEDICINE

## 2024-01-08 VITALS
SYSTOLIC BLOOD PRESSURE: 122 MMHG | WEIGHT: 201.4 LBS | HEART RATE: 87 BPM | BODY MASS INDEX: 34.38 KG/M2 | RESPIRATION RATE: 16 BRPM | OXYGEN SATURATION: 98 % | HEIGHT: 64 IN | DIASTOLIC BLOOD PRESSURE: 96 MMHG

## 2024-01-08 DIAGNOSIS — R50.9 FEVER, UNSPECIFIED FEVER CAUSE: ICD-10-CM

## 2024-01-08 DIAGNOSIS — R10.13 DYSPEPSIA: ICD-10-CM

## 2024-01-08 DIAGNOSIS — R10.13 EPIGASTRIC PAIN: ICD-10-CM

## 2024-01-08 DIAGNOSIS — R10.11 RUQ PAIN: ICD-10-CM

## 2024-01-08 DIAGNOSIS — Z00.01 ANNUAL VISIT FOR GENERAL ADULT MEDICAL EXAMINATION WITH ABNORMAL FINDINGS: ICD-10-CM

## 2024-01-08 DIAGNOSIS — E66.09 CLASS 1 OBESITY DUE TO EXCESS CALORIES WITHOUT SERIOUS COMORBIDITY WITH BODY MASS INDEX (BMI) OF 34.0 TO 34.9 IN ADULT: ICD-10-CM

## 2024-01-08 DIAGNOSIS — R10.11 RUQ PAIN: Primary | ICD-10-CM

## 2024-01-08 PROBLEM — K21.9 GASTROESOPHAGEAL REFLUX DISEASE: Status: RESOLVED | Noted: 2024-01-08 | Resolved: 2024-01-08

## 2024-01-08 PROBLEM — E66.9 OBESITY (BMI 30.0-34.9): Status: ACTIVE | Noted: 2019-03-01

## 2024-01-08 PROBLEM — F41.8 MIXED ANXIETY AND DEPRESSIVE DISORDER: Status: ACTIVE | Noted: 2024-01-08

## 2024-01-08 PROBLEM — F31.9 BIPOLAR DEPRESSION: Status: RESOLVED | Noted: 2018-02-27 | Resolved: 2024-01-08

## 2024-01-08 PROBLEM — E66.811 OBESITY (BMI 30.0-34.9): Status: ACTIVE | Noted: 2019-03-01

## 2024-01-08 PROBLEM — K82.8 GALLBLADDER SLUDGE: Status: ACTIVE | Noted: 2024-01-08

## 2024-01-08 PROCEDURE — 76705 ECHO EXAM OF ABDOMEN: CPT

## 2024-01-08 PROCEDURE — 99204 OFFICE O/P NEW MOD 45 MIN: CPT | Performed by: INTERNAL MEDICINE

## 2024-01-08 RX ORDER — OMEPRAZOLE 20 MG/1
20 CAPSULE, DELAYED RELEASE ORAL DAILY
Qty: 14 CAPSULE | Refills: 0 | Status: SHIPPED | OUTPATIENT
Start: 2024-01-08

## 2024-01-08 NOTE — PROGRESS NOTES
"CC: RUQ pain    History:  Jyotsna Peterson is a 28 y.o. female who presents today for evaluation of the above problems.  She notes symptoms of RQU and epigastric pain that began the Tuesday before [place]. She has TTP in the area and loss of appetite beginning this week. She notes radiation through to her back and she is occasionally awakened by this pain. She notes no change with meals, but has associated nausea without vomiting. She has early satiety and dyspepsia, but has not had relief with Gas-[employer]. She has noticed a lighter color to her stools in the last day, but \"not yellow or orange.\" She has noticed low-grade fever to 99 and occasionally touching 100F. She has had about a 5 pound weight loss in the last 7-10 days.   ROS:  Review of Systems   Constitutional:  Positive for fever. Negative for chills and fatigue.   Respiratory:  Negative for cough and shortness of breath.    Cardiovascular:  Negative for chest pain and palpitations.   Gastrointestinal:  Positive for abdominal pain and nausea. Negative for abdominal distention, blood in stool, constipation, diarrhea and vomiting.   Genitourinary:  Positive for dysuria. Negative for difficulty urinating.       Allergies   Allergen Reactions    Eggs Or Egg-Derived Products Hives     Past Medical History:   Diagnosis Date    Depression     Gastroesophageal reflux disease 01/08/2024    GERD (gastroesophageal reflux disease)      Past Surgical History:   Procedure Laterality Date    DILATATION AND CURETTAGE N/A 2/3/2020    Procedure: DILATATION AND CURETTAGE;  Surgeon: Ca Ying MD;  Location: [facility] LABOR DELIVERY;  Service: Obstetrics/Gynecology     Family History   Problem Relation Age of Onset    Kidney failure Maternal Grandmother     Psychosis Maternal Grandmother         Liver    Throat cancer Maternal Grandfather     Bone cancer Maternal Grandfather     Thyroid disease Maternal Grandfather       reports that she has never smoked. She has " "never been exposed to tobacco smoke. She has never used smokeless tobacco. She reports that she does not currently use alcohol. She reports that she does not use drugs.      Current Outpatient Medications:     NON FORMULARY, Take 2 tablets by mouth Daily. [facility], Disp: , Rfl:     OBJECTIVE:  /96 (BP Location: Left arm, Patient Position: Sitting, Cuff Size: Adult)   Pulse 87   Resp 16   Ht 162.6 cm (64\")   Wt 91.4 kg (201 lb 6.4 oz)   LMP  (LMP Unknown)   SpO2 98%   BMI 34.57 kg/m²    Physical Exam  Constitutional:       General: She is not in acute distress.  Eyes:      General: No scleral icterus.  Cardiovascular:      Rate and Rhythm: Normal rate and regular rhythm.      Heart sounds: Normal heart sounds. No murmur heard.  Pulmonary:      Effort: Pulmonary effort is normal.      Breath sounds: Normal breath sounds. No wheezing.   Abdominal:      General: Bowel sounds are normal. There is no distension.      Palpations: Abdomen is soft. There is no mass.      Tenderness: There is abdominal tenderness (RUQ & epigastric).      Hernia: No hernia is present.   Skin:     General: Skin is warm and dry.      Coloration: Skin is not jaundiced.   Neurological:      Mental Status: She is alert and oriented to person, place, and time.      Gait: Gait normal.   Psychiatric:         Mood and Affect: Mood normal.         Behavior: Behavior normal.         Assessment/Plan    Diagnoses and all orders for this visit:    1. RUQ pain (Primary)  2. Epigastric pain  3. Dyspepsia  4. Fever, unspecified fever cause  -     CBC (No Diff); Future  -     Lipase; Future  -     US Liver; Future  -     Urinalysis With Culture If Indicated - Urine, Clean Catch; Future  -     Pregnancy, Urine - Urine, Clean Catch; Future  -     omeprazole (priLOSEC) 20 MG capsule; Take 1 capsule by mouth Daily.  Dispense: 14 capsule; Refill: 0  -     Comprehensive Metabolic Panel; Future  Differential is wide including PBC, " cholecystitis, PUD, pancreatitis, among other pancreaticobiliary pathologies. Labs for evaluation. Given stool changes, GI pathology more highly considered than other pathologies such as aortic dissection. LMP last week, so pregnancy less likely, but UPT ordered for completeness. Labs and US today with further studies recommended pending results. Diagnostic and therapeutic trial with PPI.     5. Annual visit for general adult medical examination with abnormal findings  -     Hemoglobin A1c; Future  -     Lipid Panel; Future    6. Class 1 obesity due to excess calories without serious comorbidity with body mass index (BMI) of 34.0 to 34.9 in adult  BMI is >= 30 and <35. (Class 1 Obesity). The following options were offered after discussion;: exercise counseling/recommendations and nutrition counseling/recommendations        An After Visit Summary was printed and given to the patient at discharge.  Return in about 3 months (around 4/8/2024) for Annual physical.         Joey Medrano D.O. 1/8/2024   Electronically signed.

## 2024-01-09 ENCOUNTER — LAB (OUTPATIENT)
Dept: LAB | Facility: HOSPITAL | Age: 29
End: 2024-01-09

## 2024-01-09 DIAGNOSIS — R10.11 RUQ PAIN: ICD-10-CM

## 2024-01-09 DIAGNOSIS — R10.13 DYSPEPSIA: ICD-10-CM

## 2024-01-09 DIAGNOSIS — R50.9 FEVER, UNSPECIFIED FEVER CAUSE: ICD-10-CM

## 2024-01-09 DIAGNOSIS — R10.13 EPIGASTRIC PAIN: ICD-10-CM

## 2024-01-09 DIAGNOSIS — Z00.01 ANNUAL VISIT FOR GENERAL ADULT MEDICAL EXAMINATION WITH ABNORMAL FINDINGS: ICD-10-CM

## 2024-01-09 LAB
ALBUMIN SERPL-MCNC: 4.2 G/DL (ref 3.5–5.2)
ALBUMIN/GLOB SERPL: 1.4 G/DL
ALP SERPL-CCNC: 82 U/L (ref 39–117)
ALT SERPL W P-5'-P-CCNC: 24 U/L (ref 1–33)
ANION GAP SERPL CALCULATED.3IONS-SCNC: 9 MMOL/L (ref 5–15)
AST SERPL-CCNC: 14 U/L (ref 1–32)
B-HCG UR QL: NEGATIVE
BILIRUB SERPL-MCNC: 0.3 MG/DL (ref 0–1.2)
BILIRUB UR QL STRIP: NEGATIVE
BUN SERPL-MCNC: 11 MG/DL (ref 6–20)
BUN/CREAT SERPL: 14.7 (ref 7–25)
CALCIUM SPEC-SCNC: 9.1 MG/DL (ref 8.6–10.5)
CHLORIDE SERPL-SCNC: 104 MMOL/L (ref 98–107)
CHOLEST SERPL-MCNC: 159 MG/DL (ref 0–200)
CLARITY UR: CLEAR
CO2 SERPL-SCNC: 25 MMOL/L (ref 22–29)
COLOR UR: YELLOW
CREAT SERPL-MCNC: 0.75 MG/DL (ref 0.57–1)
DEPRECATED RDW RBC AUTO: 40.9 FL (ref 37–54)
EGFRCR SERPLBLD CKD-EPI 2021: 110.7 ML/MIN/1.73
ERYTHROCYTE [DISTWIDTH] IN BLOOD BY AUTOMATED COUNT: 12.9 % (ref 12.3–15.4)
GLOBULIN UR ELPH-MCNC: 2.9 GM/DL
GLUCOSE SERPL-MCNC: 90 MG/DL (ref 65–99)
GLUCOSE UR STRIP-MCNC: NEGATIVE MG/DL
HBA1C MFR BLD: 5 % (ref 4.8–5.6)
HCT VFR BLD AUTO: 39 % (ref 34–46.6)
HDLC SERPL-MCNC: 42 MG/DL (ref 40–60)
HGB BLD-MCNC: 12.5 G/DL (ref 12–15.9)
HGB UR QL STRIP.AUTO: NEGATIVE
KETONES UR QL STRIP: NEGATIVE
LDLC SERPL CALC-MCNC: 103 MG/DL (ref 0–100)
LDLC/HDLC SERPL: 2.45 {RATIO}
LEUKOCYTE ESTERASE UR QL STRIP.AUTO: NEGATIVE
LIPASE SERPL-CCNC: 28 U/L (ref 13–60)
MCH RBC QN AUTO: 28 PG (ref 26.6–33)
MCHC RBC AUTO-ENTMCNC: 32.1 G/DL (ref 31.5–35.7)
MCV RBC AUTO: 87.4 FL (ref 79–97)
NITRITE UR QL STRIP: NEGATIVE
PH UR STRIP.AUTO: 6.5 [PH] (ref 5–8)
PLATELET # BLD AUTO: 286 10*3/MM3 (ref 140–450)
PMV BLD AUTO: 9.9 FL (ref 6–12)
POTASSIUM SERPL-SCNC: 4.1 MMOL/L (ref 3.5–5.2)
PROT SERPL-MCNC: 7.1 G/DL (ref 6–8.5)
PROT UR QL STRIP: NEGATIVE
RBC # BLD AUTO: 4.46 10*6/MM3 (ref 3.77–5.28)
SODIUM SERPL-SCNC: 138 MMOL/L (ref 136–145)
SP GR UR STRIP: 1.01 (ref 1–1.03)
TRIGL SERPL-MCNC: 70 MG/DL (ref 0–150)
UROBILINOGEN UR QL STRIP: NORMAL
VLDLC SERPL-MCNC: 14 MG/DL (ref 5–40)
WBC NRBC COR # BLD AUTO: 9.58 10*3/MM3 (ref 3.4–10.8)

## 2024-01-09 PROCEDURE — 80061 LIPID PANEL: CPT

## 2024-01-09 PROCEDURE — 85027 COMPLETE CBC AUTOMATED: CPT

## 2024-01-09 PROCEDURE — 36415 COLL VENOUS BLD VENIPUNCTURE: CPT

## 2024-01-09 PROCEDURE — 83690 ASSAY OF LIPASE: CPT

## 2024-01-09 PROCEDURE — 81003 URINALYSIS AUTO W/O SCOPE: CPT

## 2024-01-09 PROCEDURE — 80053 COMPREHEN METABOLIC PANEL: CPT

## 2024-01-09 PROCEDURE — 83036 HEMOGLOBIN GLYCOSYLATED A1C: CPT

## 2024-01-09 PROCEDURE — 81025 URINE PREGNANCY TEST: CPT

## 2024-01-11 ENCOUNTER — PATIENT ROUNDING (BHMG ONLY) (OUTPATIENT)
Dept: INTERNAL MEDICINE | Facility: CLINIC | Age: 29
End: 2024-01-11

## 2024-01-11 NOTE — PROGRESS NOTES
January 11, 2024    Hello, may I speak with Jyotsna Peterson?    My name is FERNANDO ORTIZ      I am  with [facility]  [address], [address]  [facility] [facility] [address] [phone].    Before we get started may I verify your date of birth? 1995    I am calling to officially welcome you to our practice and ask about your recent visit. Is this a good time to talk? yes    Tell me about your visit with us. What things went well?  IT WAS GOOD. ALL MY QUESTIONS WERE RESOLVED. DR. LEMUS IS VERY NICE.       We're always looking for ways to make our patients' experiences even better. Do you have recommendations on ways we may improve?  no    Overall were you satisfied with your first visit to our practice? yes       I appreciate you taking the time to speak with me today. Is there anything else I can do for you? no      Thank you, and have a great day.

## 2024-01-17 ENCOUNTER — TELEPHONE (OUTPATIENT)
Dept: INTERNAL MEDICINE | Facility: CLINIC | Age: 29
End: 2024-01-17
Payer: COMMERCIAL

## 2024-01-17 NOTE — TELEPHONE ENCOUNTER
----- Message from Joey Medrano DO sent at 1/9/2024 10:06 PM CST -----  Have we heard anything about how she is feeling? If not, please contact her to inquire. We are hoping to hear that her stomach is feeling better.     Left a message for a return call back

## 2024-03-08 ENCOUNTER — OFFICE VISIT (OUTPATIENT)
Dept: INTERNAL MEDICINE | Facility: CLINIC | Age: 29
End: 2024-03-08
Payer: COMMERCIAL

## 2024-03-08 VITALS
HEIGHT: 64 IN | HEART RATE: 91 BPM | BODY MASS INDEX: 35.01 KG/M2 | SYSTOLIC BLOOD PRESSURE: 138 MMHG | DIASTOLIC BLOOD PRESSURE: 108 MMHG | WEIGHT: 205.1 LBS | OXYGEN SATURATION: 98 % | RESPIRATION RATE: 16 BRPM

## 2024-03-08 DIAGNOSIS — S66.912A STRAIN OF LEFT WRIST, INITIAL ENCOUNTER: Primary | ICD-10-CM

## 2024-03-08 DIAGNOSIS — M79.602 LEFT ARM PAIN: ICD-10-CM

## 2024-03-08 DIAGNOSIS — G56.92 NEUROPATHY OF HAND, LEFT: ICD-10-CM

## 2024-03-08 NOTE — PROGRESS NOTES
"CC: L wrist pain and neuropathy    History:  Jyotsna Peterson is a 28 y.o. female presenting today with complaint of L wrist pain that began about a week ago. Patient states for the past week she has been having a burning sensation in her L wrist that involves her first 3 digits and radiates along the medial aspect of her forearm that travels proximally to the the upper arm. Pain has been waking her up at night. She tried ibuprofen today which helped. Ordered a splint as well that is being delivered tonight/ She denies any trauma or falls. Says she did notice the pain more significantly after attempting to open a jar. Pt is right handed. Her occupation involves a lot of typing.       ROS:  Review of Systems   Constitutional:  Negative for fever.   Cardiovascular:  Negative for chest pain.   Musculoskeletal:  Positive for arthralgias. Negative for joint swelling and neck pain.         L wrist pain and burning sensation    Skin:  Negative for rash and wound.   Neurological:  Positive for numbness. Negative for weakness and headaches.        reports that she has never smoked. She has never been exposed to tobacco smoke. She has never used smokeless tobacco. She reports that she does not currently use alcohol. She reports that she does not use drugs.      Current Outpatient Medications:     NON FORMULARY, Take 2 tablets by mouth Daily. [facility], Disp: , Rfl:     omeprazole (priLOSEC) 20 MG capsule, Take 1 capsule by mouth Daily., Disp: 14 capsule, Rfl: 0    OBJECTIVE:  BP (!) 138/108 (BP Location: Left arm, Patient Position: Sitting, Cuff Size: Adult)   Pulse 91   Resp 16   Ht 162.6 cm (64\")   Wt 93 kg (205 lb 1.6 oz)   LMP  (LMP Unknown)   SpO2 98%   BMI 35.21 kg/m²    Physical Exam  Vitals and nursing note reviewed.   Constitutional:       General: She is not in acute distress.     Appearance: She is not ill-appearing or toxic-appearing.   HENT:      Head: Normocephalic and atraumatic.      " Nose: Nose normal.      Mouth/Throat:      Mouth: Mucous membranes are moist.   Eyes:      Extraocular Movements: Extraocular movements intact.      Conjunctiva/sclera: Conjunctivae normal.      Pupils: Pupils are equal, round, and reactive to light.   Cardiovascular:      Rate and Rhythm: Normal rate and regular rhythm.   Pulmonary:      Effort: Pulmonary effort is normal. No respiratory distress.   Musculoskeletal:      Cervical back: Normal range of motion.      Comments: Negative tenderness to palpation of the left wrist, negative left Tinel's over median nerve. Negative Phalen's on the left. Negative for sensory or motor change in the LUE. 5/5 strength of  and finger abduction and adduction. Intact opposition of thumb on the left.    Neurological:      General: No focal deficit present.      Mental Status: She is alert and oriented to person, place, and time. Mental status is at baseline.         Assessment/Plan     Diagnoses and all orders for this visit:    1. Strain of left wrist, initial encounter (Primary)    2. Neuropathy of hand, left    3. Left arm pain    Recommend RICE therapy with NSAID doses given with AVS in patient instructions. She is receiving brace through [employer] today and will start wearing. We would consider EMG/NCS if not improving in the next 2-4 weeks.     Some portions of this note including history and physical were obtained by a medical student. However, the full history, ROS, physical exam and plan were discussed and reviewed with the student and patient. Physical exam is my own. All elements of this note are reviewed and represent solely my assessment and plan.    An After Visit Summary was printed and given to the patient at discharge.  Return for Next scheduled follow up.      Joey Medrano D.O. 3/8/2024   Electronically signed.

## 2024-09-11 ENCOUNTER — LAB (OUTPATIENT)
Dept: LAB | Facility: HOSPITAL | Age: 29
End: 2024-09-11
Payer: COMMERCIAL

## 2024-09-11 ENCOUNTER — TELEPHONE (OUTPATIENT)
Dept: INTERNAL MEDICINE | Facility: CLINIC | Age: 29
End: 2024-09-11
Payer: COMMERCIAL

## 2024-09-11 DIAGNOSIS — N93.9 ABNORMAL VAGINAL BLEEDING: ICD-10-CM

## 2024-09-11 DIAGNOSIS — N93.9 ABNORMAL VAGINAL BLEEDING: Primary | ICD-10-CM

## 2024-09-11 LAB — HCG INTACT+B SERPL-ACNC: <0.1 MIU/ML

## 2024-09-11 PROCEDURE — 84702 CHORIONIC GONADOTROPIN TEST: CPT

## 2024-09-11 PROCEDURE — 36415 COLL VENOUS BLD VENIPUNCTURE: CPT

## 2024-11-11 ENCOUNTER — OFFICE VISIT (OUTPATIENT)
Age: 29
End: 2024-11-11
Payer: COMMERCIAL

## 2024-11-11 VITALS
HEIGHT: 64 IN | SYSTOLIC BLOOD PRESSURE: 122 MMHG | DIASTOLIC BLOOD PRESSURE: 84 MMHG | BODY MASS INDEX: 36.02 KG/M2 | WEIGHT: 211 LBS

## 2024-11-11 DIAGNOSIS — O09.292: ICD-10-CM

## 2024-11-11 DIAGNOSIS — Z32.01 POSITIVE PREGNANCY TEST: Primary | ICD-10-CM

## 2024-11-11 LAB
B-HCG UR QL: POSITIVE
EXPIRATION DATE: ABNORMAL
INTERNAL NEGATIVE CONTROL: NEGATIVE
INTERNAL POSITIVE CONTROL: POSITIVE
Lab: ABNORMAL

## 2024-11-11 PROCEDURE — 81025 URINE PREGNANCY TEST: CPT | Performed by: OBSTETRICS & GYNECOLOGY

## 2024-11-11 PROCEDURE — 99203 OFFICE O/P NEW LOW 30 MIN: CPT | Performed by: OBSTETRICS & GYNECOLOGY

## 2024-11-11 RX ORDER — PRENATAL VIT NO.126/IRON/FOLIC 28MG-0.8MG
TABLET ORAL DAILY
COMMUNITY

## 2024-11-11 NOTE — PROGRESS NOTES
"Jyotsna Peterson is a 29 y.o. female Here today for pregnancy counseling.  She has a history of early second trimester rupture of membranes at 16 weeks followed by spontaneous loss prior to 20 weeks.  This happened in 2020.  She is currently taking a prenatal vitamin and just had a positive pregnancy test.  She is about 4 weeks by dates.  She has no history of cervical procedures.    Visit Vitals  /84 (BP Location: Left arm, Patient Position: Sitting)   Ht 162.6 cm (64\")   Wt 95.7 kg (211 lb)   LMP 10/14/2024   BMI 36.22 kg/m²      Pleasant female no acute distress  Mood and affect normal  Breathing unlabored    The urine pregnancy test in the office today is positive    Assessment: History of second trimester miscarriage with rupture of membranes, positive pregnancy test    She asked about progesterone supplementation.  There is no evidence that progesterone supplementation at this stage of pregnancy will decrease her risk of recurrent second trimester rupture of membranes.  Injectable progesterone is off the market.  She will return in 2 to 3 weeks for a initial OB appointment.  We will plan early referral to MFM to follow cervical length because of her history.  Discussed over-the-counter medications for pregnancy related nausea.  Call with questions or concerns.        "

## 2024-12-05 ENCOUNTER — INITIAL PRENATAL (OUTPATIENT)
Age: 29
End: 2024-12-05
Payer: COMMERCIAL

## 2024-12-05 VITALS — SYSTOLIC BLOOD PRESSURE: 112 MMHG | WEIGHT: 219 LBS | BODY MASS INDEX: 37.59 KG/M2 | DIASTOLIC BLOOD PRESSURE: 84 MMHG

## 2024-12-05 DIAGNOSIS — O09.211 PREVIOUS PRETERM DELIVERY, ANTEPARTUM, FIRST TRIMESTER: Primary | ICD-10-CM

## 2024-12-05 NOTE — PROGRESS NOTES
New OB, US ordered today, reviewed and shows 7 weeks gestation, EDC 25  Prior pregnancy complicated by rupture of membranes at 16 weeks  Will refer to M after next visit for cervical length  Having mild nausea, fatigue, but no headaches  New OB labs ordered today  Discussed OTC Unisom and B6  Discussed Tdap and flu vaccine recommendations  Discussed ffDNA screening option  Discussed normal prenatal routines  Questions answered    Diagnoses and all orders for this visit:    1. Previous  delivery, antepartum, first trimester (Primary)  -     ABO / Rh  -     Ambulatory Referral to [facility]/Perinatology  -     Chlamydia trachomatis, Neisseria gonorrhoeae, PCR w/ confirmation - Urine, Urine, Clean Catch  -     Antibody Screen  -     CBC & Differential  -     Hepatitis B Surface Antigen  -     Urine Culture - Urine, Urine, Clean Catch  -     ToxASSURE Select 13 (MW) - Urine, Clean Catch  -     Rubella Antibody, IgG  -     RPR Qualitative with Reflex to Quant  -     HIV-1 / O / 2 Ag / Antibody  -     Hepatitis C Antibody

## 2024-12-12 ENCOUNTER — ROUTINE PRENATAL (OUTPATIENT)
Age: 29
End: 2024-12-12
Payer: COMMERCIAL

## 2024-12-12 ENCOUNTER — TELEPHONE (OUTPATIENT)
Age: 29
End: 2024-12-12
Payer: COMMERCIAL

## 2024-12-12 VITALS — DIASTOLIC BLOOD PRESSURE: 72 MMHG | BODY MASS INDEX: 36.9 KG/M2 | SYSTOLIC BLOOD PRESSURE: 116 MMHG | WEIGHT: 215 LBS

## 2024-12-12 DIAGNOSIS — O09.211 PREVIOUS PRETERM DELIVERY, ANTEPARTUM, FIRST TRIMESTER: ICD-10-CM

## 2024-12-12 DIAGNOSIS — Z3A.08 8 WEEKS GESTATION OF PREGNANCY: Primary | ICD-10-CM

## 2024-12-12 DIAGNOSIS — O20.9 VAGINAL BLEEDING IN PREGNANCY, FIRST TRIMESTER: ICD-10-CM

## 2024-12-12 LAB
GLUCOSE UR STRIP-MCNC: NEGATIVE MG/DL
PROT UR STRIP-MCNC: NEGATIVE MG/DL

## 2024-12-12 NOTE — TELEPHONE ENCOUNTER
Spoke with patient by phone regarding the spotting and bleeding she has experienced this morning. Reviewed with Yaquelin and offered pt US at 230 and visit at 3 with Yaquelin today. Pt agreeable to appt times.

## 2024-12-12 NOTE — PROGRESS NOTES
Reason for visit: Routine OB visit at 8w2d. LINETTE 2025, by Ultrasound    CC:  Has been having some spotting when she wipes-- every time. Not a large amount, but it has her nervous due to her history of  delivery and subsequent loss. Denies LOF, pelvic pain, or cramping.     ROS: All systems reviewed and are negative with exception of the following: vaginal spotting, amenorrhea    Wt 215 lb for a TWG of Not found., /72, FHTs 179  Urine today and reviewed: unable to void    US today: CRL matches dates, FHT's at 179, no TERESA noted.  Anatomy Scan: scheduled for 24 at 9:00    Exam:  General Appearance:  Healthy appearing . Normal mood and behavior.  HEENT: NCAT, EOMI  HR str and reg. Lungs clear. Resp even and unlabored.  Abdomen is soft and nontender. No CVA tenderness. Uterus is consistent with EGA  Ext: no edema, nontender, no trauma, or cyanosis.    Impression  Diagnoses and all orders for this visit:    1. 8 weeks gestation of pregnancy (Primary)  Discussed first trimester of pregnancy, including discomforts and measures of comfort, pelvic pain/cramping warnings, bleeding warnings, and signs and symptoms to report. Discussed and encouraged to come to the hospital or call for vaginal bleeding, suspected leaking of fluid, pelvic pain/cramping, or any other concerns. First trimester of pregnancy video and first trimester bleeding education included in the AVS.  -     POC Urinalysis Dipstick    2. Previous  delivery, antepartum, first trimester  -     Progesterone 200 MG suppository; Insert 1 suppository into the vagina Every Night.  Dispense: 30 each; Refill: 5    3. Vaginal bleeding in pregnancy, first trimester  Discussed US results today are reassuring. No sign of any concerns inside the uterus. Spotting can be normal in early pregnancy. Sometimes this happens simply because of the increased blood flow to the cervix. Offered to do a pelvic exam/check for vaginal infections if patient  desires/if this would help alleviate anxiety. Denies vaginal irritation or abnormal discharge/odor, so we chose to hold off on a pelvic exam at this time. Discussed that she did the right thing by coming in for evaluation since she was concerned.   -     Progesterone 200 MG suppository; Insert 1 suppository into the vagina Every Night.  Dispense: 30 each; Refill: 5            Follow up in 3 weeks for routine visit with Dr. Valle; call or come in sooner with questions or concerns.    This note has been signed electronically.  FADUMO Lane, AYANNA

## 2024-12-14 LAB
ABO GROUP BLD: NORMAL
BACTERIA UR CULT: NO GROWTH
BACTERIA UR CULT: NORMAL
BASOPHILS # BLD AUTO: 0 X10E3/UL (ref 0–0.2)
BASOPHILS NFR BLD AUTO: 0 %
BLD GP AB SCN SERPL QL: NEGATIVE
C TRACH RRNA SPEC QL NAA+PROBE: NEGATIVE
DRUGS UR: NORMAL
EOSINOPHIL # BLD AUTO: 0.3 X10E3/UL (ref 0–0.4)
EOSINOPHIL NFR BLD AUTO: 4 %
ERYTHROCYTE [DISTWIDTH] IN BLOOD BY AUTOMATED COUNT: 12.5 % (ref 11.7–15.4)
HBV SURFACE AG SERPL QL IA: NEGATIVE
HCT VFR BLD AUTO: 35 % (ref 34–46.6)
HCV IGG SERPL QL IA: NON REACTIVE
HGB BLD-MCNC: 11.6 G/DL (ref 11.1–15.9)
HIV 1+2 AB+HIV1 P24 AG SERPL QL IA: NON REACTIVE
IMM GRANULOCYTES # BLD AUTO: 0 X10E3/UL (ref 0–0.1)
IMM GRANULOCYTES NFR BLD AUTO: 0 %
LYMPHOCYTES # BLD AUTO: 1.7 X10E3/UL (ref 0.7–3.1)
LYMPHOCYTES NFR BLD AUTO: 21 %
MCH RBC QN AUTO: 29.2 PG (ref 26.6–33)
MCHC RBC AUTO-ENTMCNC: 33.1 G/DL (ref 31.5–35.7)
MCV RBC AUTO: 88 FL (ref 79–97)
MONOCYTES # BLD AUTO: 0.5 X10E3/UL (ref 0.1–0.9)
MONOCYTES NFR BLD AUTO: 6 %
N GONORRHOEA RRNA SPEC QL NAA+PROBE: NEGATIVE
NEUTROPHILS # BLD AUTO: 5.6 X10E3/UL (ref 1.4–7)
NEUTROPHILS NFR BLD AUTO: 69 %
PLATELET # BLD AUTO: 288 X10E3/UL (ref 150–450)
RBC # BLD AUTO: 3.97 X10E6/UL (ref 3.77–5.28)
RH BLD: POSITIVE
RPR SER QL: NON REACTIVE
RUBV IGG SERPL IA-ACNC: 2.18 INDEX
WBC # BLD AUTO: 8.1 X10E3/UL (ref 3.4–10.8)

## 2025-01-03 ENCOUNTER — ROUTINE PRENATAL (OUTPATIENT)
Age: 30
End: 2025-01-03
Payer: COMMERCIAL

## 2025-01-03 VITALS — DIASTOLIC BLOOD PRESSURE: 76 MMHG | BODY MASS INDEX: 37.81 KG/M2 | SYSTOLIC BLOOD PRESSURE: 116 MMHG | WEIGHT: 220.3 LBS

## 2025-01-03 DIAGNOSIS — O09.211 PREVIOUS PRETERM DELIVERY, ANTEPARTUM, FIRST TRIMESTER: Primary | ICD-10-CM

## 2025-01-03 DIAGNOSIS — Z34.81 ENCOUNTER FOR SUPERVISION OF OTHER NORMAL PREGNANCY IN FIRST TRIMESTER: ICD-10-CM

## 2025-01-03 DIAGNOSIS — Z13.71 SCREENING FOR GENETIC DISEASE CARRIER STATUS: ICD-10-CM

## 2025-01-03 NOTE — PROGRESS NOTES
Feeling well, no nausea  No further spotting  Reviewed normal prenatal labs  Has MFM appointment next week  ffDNA and maternal carrier screening today    Diagnoses and all orders for this visit:    1. Previous  delivery, antepartum, first trimester (Primary)    2. Encounter for supervision of other normal pregnancy in first trimester  -     [facility] Prenatal Test: Chromosomes 13, 18, 21, X & Y: Triploidy 22Q.11.2 Deletion - Blood,    3. Screening for genetic disease carrier status  -     [employer] Horizon 14 (Pan-Ethnic Standard) - Blood, Blood, Venous

## 2025-01-10 LAB
Lab: NEGATIVE
Lab: NORMAL
NTRA ALPHA-THALASSEMIA: NEGATIVE
NTRA BETA-HEMOGLOBINOPATHIES: NEGATIVE
NTRA CANAVAN DISEASE: NEGATIVE
NTRA CYSTIC FIBROSIS: NEGATIVE
NTRA DUCHENNE/BECKER MUSCULAR DYSTROPHY: NEGATIVE
NTRA FAMILIAL DYSAUTONOMIA: NEGATIVE
NTRA FRAGILE X SYNDROME: NEGATIVE
NTRA GALACTOSEMIA: NEGATIVE
NTRA GAUCHER DISEASE: NEGATIVE
NTRA MEDIUM CHAIN ACYL-COA DEHYDROGENASE DEFICIENCY: NEGATIVE
NTRA POLYCYSTIC KIDNEY DISEASE, AUTOSOMAL RECESSIVE: NEGATIVE
NTRA SMITH-LEMLI-OPITZ SYNDROME: NEGATIVE
NTRA SPINAL MUSCULAR ATROPHY: NEGATIVE
NTRA TAY-SACHS DISEASE: NEGATIVE

## 2025-01-13 ENCOUNTER — CLINICAL SUPPORT (OUTPATIENT)
Age: 30
End: 2025-01-13
Payer: COMMERCIAL

## 2025-01-13 DIAGNOSIS — Z3A.12 12 WEEKS GESTATION OF PREGNANCY: Primary | ICD-10-CM

## 2025-01-13 PROCEDURE — 90471 IMMUNIZATION ADMIN: CPT | Performed by: OBSTETRICS & GYNECOLOGY

## 2025-01-13 PROCEDURE — 90656 IIV3 VACC NO PRSV 0.5 ML IM: CPT | Performed by: OBSTETRICS & GYNECOLOGY

## 2025-01-31 ENCOUNTER — ROUTINE PRENATAL (OUTPATIENT)
Age: 30
End: 2025-01-31
Payer: COMMERCIAL

## 2025-01-31 VITALS — DIASTOLIC BLOOD PRESSURE: 80 MMHG | WEIGHT: 224 LBS | SYSTOLIC BLOOD PRESSURE: 108 MMHG | BODY MASS INDEX: 38.45 KG/M2

## 2025-01-31 DIAGNOSIS — Z3A.15 15 WEEKS GESTATION OF PREGNANCY: ICD-10-CM

## 2025-01-31 DIAGNOSIS — O09.211 PREVIOUS PRETERM DELIVERY, ANTEPARTUM, FIRST TRIMESTER: Primary | ICD-10-CM

## 2025-01-31 NOTE — PROGRESS NOTES
Feeling well  Reviewed normal prenatal labs  Discussed normal ffDNA and maternal carrier screening  Sees MFM on Monday for first cervical length    Diagnoses and all orders for this visit:    1. Previous  delivery, antepartum, first trimester (Primary)    2. 15 weeks gestation of pregnancy

## 2025-02-04 ENCOUNTER — TELEMEDICINE (OUTPATIENT)
Dept: FAMILY MEDICINE CLINIC | Facility: TELEHEALTH | Age: 30
End: 2025-02-04
Payer: COMMERCIAL

## 2025-02-04 VITALS — TEMPERATURE: 99.5 F | HEART RATE: 86 BPM

## 2025-02-04 DIAGNOSIS — J06.9 ACUTE URI: Primary | ICD-10-CM

## 2025-02-04 PROCEDURE — 99213 OFFICE O/P EST LOW 20 MIN: CPT | Performed by: NURSE PRACTITIONER

## 2025-02-04 NOTE — PATIENT INSTRUCTIONS
Upper Respiratory Infection, Adult  An upper respiratory infection (URI) is a common viral infection of the nose, throat, and upper air passages that lead to the lungs. The most common type of URI is the common cold. URIs usually get better on their own, without medical treatment.  What are the causes?  A URI is caused by a virus. You may catch a virus by:  Breathing in droplets from an infected person's cough or sneeze.  Touching something that has been exposed to the virus (is contaminated) and then touching your mouth, nose, or eyes.  What increases the risk?  You are more likely to get a URI if:  You are very young or very old.  You have close contact with others, such as at work, school, or a health care facility.  You smoke.  You have long-term (chronic) heart or lung disease.  You have a weakened disease-fighting system (immune system).  You have nasal allergies or asthma.  You are experiencing a lot of stress.  You have poor nutrition.  What are the signs or symptoms?  A URI usually involves some of the following symptoms:  Runny or stuffy (congested) nose.  Cough.  Sneezing.  Sore throat.  Headache.  Fatigue.  Fever.  Loss of appetite.  Pain in your forehead, behind your eyes, and over your cheekbones (sinus pain).  Muscle aches.  Redness or irritation of the eyes.  Pressure in the ears or face.  How is this diagnosed?  This condition may be diagnosed based on your medical history and symptoms, and a physical exam. Your health care provider may use a swab to take a mucus sample from your nose (nasal swab). This sample can be tested to determine what virus is causing the illness.  How is this treated?  URIs usually get better on their own within 7-10 days. Medicines cannot cure URIs, but your health care provider may recommend certain medicines to help relieve symptoms, such as:  Over-the-counter cold medicines.  Cough suppressants. Coughing is a type of defense against infection that helps to clear the  respiratory system, so take these medicines only as recommended by your health care provider.  Fever-reducing medicines.  Follow these instructions at home:  Activity  Rest as needed.  If you have a fever, stay home from work or school until your fever is gone or until your health care provider says your URI cannot spread to other people (is no longer contagious). Your health care provider may have you wear a face mask to prevent your infection from spreading.  Relieving symptoms  Gargle with a mixture of salt and water 3-4 times a day or as needed. To make salt water, completely dissolve ½-1 tsp (3-6 g) of salt in 1 cup (237 mL) of warm water.  Use a cool-mist humidifier to add moisture to the air. This can help you breathe more easily.  Eating and drinking    Drink enough fluid to keep your urine pale yellow.  Eat soups and other clear broths.  General instructions    Take over-the-counter and prescription medicines only as told by your health care provider. These include cold medicines, fever reducers, and cough suppressants.  Do not use any products that contain nicotine or tobacco. These products include cigarettes, chewing tobacco, and vaping devices, such as e-cigarettes. If you need help quitting, ask your health care provider.  Stay away from secondhand smoke.  Stay up to date on all immunizations, including the yearly (annual) flu vaccine.  Keep all follow-up visits. This is important.  How to prevent the spread of infection to others  URIs can be contagious. To prevent the infection from spreading:  Wash your hands with soap and water for at least 20 seconds. If soap and water are not available, use hand .  Avoid touching your mouth, face, eyes, or nose.  Cough or sneeze into a tissue or your sleeve or elbow instead of into your hand or into the air.    Contact a health care provider if:  You are getting worse instead of better.  You have a fever or chills.  Your mucus is brown or red.  You have  yellow or brown discharge coming from your nose.  You have pain in your face, especially when you bend forward.  You have swollen neck glands.  You have pain while swallowing.  You have white areas in the back of your throat.  Get help right away if:  You have shortness of breath that gets worse.  You have severe or persistent:  Headache.  Ear pain.  Sinus pain.  Chest pain.  You have chronic lung disease along with any of the following:  Making high-pitched whistling sounds when you breathe, most often when you breathe out (wheezing).  Prolonged cough (more than 14 days).  Coughing up blood.  A change in your usual mucus.  You have a stiff neck.  You have changes in your:  Vision.  Hearing.  Thinking.  Mood.  These symptoms may be an emergency. Get help right away. Call 911.  Do not wait to see if the symptoms will go away.  Do not drive yourself to the hospital.  Summary  An upper respiratory infection (URI) is a common infection of the nose, throat, and upper air passages that lead to the lungs.  A URI is caused by a virus.  URIs usually get better on their own within 7-10 days.  Medicines cannot cure URIs, but your health care provider may recommend certain medicines to help relieve symptoms.  This information is not intended to replace advice given to you by your health care provider. Make sure you discuss any questions you have with your health care provider.  Document Revised: 07/20/2022 Document Reviewed: 07/20/2022  [employer] Patient Education © 2024 [employer]

## 2025-02-04 NOTE — LETTER
February 4, 2025     Patient: Jyotsna Peterson   YOB: 1995   Date of Visit: 2/4/2025       To Whom It May Concern:    It is my medical opinion that Jyotsna Peterson may return to work 2/6/2025.            Sincerely,        FADUMO Holguin    CC: No Recipients

## 2025-02-04 NOTE — PROGRESS NOTES
No chief complaint on file.      Video Visit Reason:   Free Text Description: JONNIE LEVI sore throat, difficulty swallowing, pain with swallowing, congestion  Subjective   Hope Kiarra Peterson is a 29 y.o. female.     History of Present Illness  The patient presents via virtual visit for evaluation of a sore throat, painful swallowing, and congestion.    She reports the onset of a sore throat on Saturday, followed by the development of congestion yesterday. She is not experiencing any fever or chills but notes mild leg soreness. Upon palpation, she detects non-tender bumps in her neck.    She is currently 16 weeks pregnant.    SOCIAL HISTORY  She works in [employer] at [employer].        The following portions of the patient's history were reviewed and updated as appropriate: allergies, current medications, past medical history, and problem list.      Past Medical History:   Diagnosis Date    Depression     Gastroesophageal reflux disease 01/08/2024    GERD (gastroesophageal reflux disease)      Social History     Socioeconomic History    Marital status:    Tobacco Use    Smoking status: Never     Passive exposure: Never    Smokeless tobacco: Never   Vaping Use    Vaping status: Never Used   Substance and Sexual Activity    Alcohol use: Not Currently    Drug use: Never    Sexual activity: Yes     Partners: Male     medication documentation: reviewed and updated with patient and   Current Outpatient Medications:     prenatal vitamin (prenatal, CLASSIC, vitamin) tablet, Take  by mouth Daily., Disp: , Rfl:   Review of Systems  See HPI  Objective   Pulse 86   Temp 99.5 °F (37.5 °C)   LMP 10/14/2024   Tyto  Physical Exam  Constitutional:       General: She is not in acute distress.  HENT:      Mouth/Throat:      Pharynx: Uvula midline. Posterior oropharyngeal erythema present. No pharyngeal swelling, oropharyngeal exudate or uvula swelling.   Cardiovascular:      Rate and Rhythm: Normal rate.    Pulmonary:      Effort: Pulmonary effort is normal.      Breath sounds: Normal breath sounds.   Lymphadenopathy:      Head:      Right side of head: Tonsillar adenopathy present.      Left side of head: Tonsillar adenopathy present.      Cervical: Cervical adenopathy (patient guided exam) present.   Neurological:      Mental Status: She is alert.         Assessment & Plan   Diagnoses and all orders for this visit:    1. Acute URI (Primary)  -     Covid-19 + Flu A&B AG, Veritor; Future  -     POC Strep A, PCR ([facility]); Future     She is currently 16 weeks pregnant. A comprehensive diagnostic approach will be undertaken, including tests for strep, influenza, and COVID-19. She will be contacted with the results, and the subsequent treatment plan will be determined based on these findings.     All tests were negative. Rest, fluids and symptomatic treatment is recommended.         Follow Up:  If your symptoms are not resolving by the completion of your treatment or are worsening, see your primary care provider for follow up. If you don't have a primary care provider, you may go to any Urgent Care for re-evaluation. If you develop any life threatening symptoms, go to the nearest Emergency Department immediately or call EMS.       Patient or patient representative verbalized consent for the use of Ambient Listening during the visit with  FADUMO Holguin for chart documentation. 2/4/2025  09:36 EST        The use of  Video Visit was utilized during this visit, using both [employer] and [employer]. The use of a video visit has been reviewed with the patient and verbal informed consent has been obtained. No technical difficulties occurred during the visit.    is located at [address] [place] [id number]  Provider is located at [place], [place]

## 2025-03-03 ENCOUNTER — ROUTINE PRENATAL (OUTPATIENT)
Age: 30
End: 2025-03-03
Payer: COMMERCIAL

## 2025-03-03 VITALS — BODY MASS INDEX: 39.99 KG/M2 | SYSTOLIC BLOOD PRESSURE: 124 MMHG | DIASTOLIC BLOOD PRESSURE: 78 MMHG | WEIGHT: 233 LBS

## 2025-03-03 DIAGNOSIS — Z3A.19 19 WEEKS GESTATION OF PREGNANCY: ICD-10-CM

## 2025-03-03 DIAGNOSIS — O09.212 PREVIOUS PRETERM DELIVERY, ANTEPARTUM, SECOND TRIMESTER: Primary | ICD-10-CM

## 2025-03-03 NOTE — PROGRESS NOTES
Starting to feel fetal movement  Feeling well  Reviewed normal prenatal labs  Reviewed normal ffDNA  Cervical length at 17 weeks was 3cm  Has anatomy US and CL with MFM in 2 days    Diagnoses and all orders for this visit:    1. Previous  delivery, antepartum, second trimester (Primary)    2. 19 weeks gestation of pregnancy

## 2025-03-06 ENCOUNTER — HOSPITAL ENCOUNTER (EMERGENCY)
Facility: HOSPITAL | Age: 30
Discharge: HOME | End: 2025-03-06
Attending: OBSTETRICS & GYNECOLOGY | Admitting: OBSTETRICS & GYNECOLOGY
Payer: COMMERCIAL

## 2025-03-06 VITALS
HEIGHT: 64 IN | OXYGEN SATURATION: 100 % | SYSTOLIC BLOOD PRESSURE: 138 MMHG | WEIGHT: 236 LBS | DIASTOLIC BLOOD PRESSURE: 83 MMHG | TEMPERATURE: 97.8 F | HEART RATE: 98 BPM | BODY MASS INDEX: 40.29 KG/M2 | RESPIRATION RATE: 16 BRPM

## 2025-03-06 LAB
BACTERIA UR QL AUTO: ABNORMAL /HPF
BILIRUB UR QL STRIP: NEGATIVE
CLARITY UR: CLEAR
CLUE CELLS SPEC QL WET PREP: ABNORMAL
COLOR UR: YELLOW
GLUCOSE UR STRIP-MCNC: NEGATIVE MG/DL
HGB UR QL STRIP.AUTO: NEGATIVE
HYDATID CYST SPEC WET PREP: ABNORMAL
KETONES UR QL STRIP: NEGATIVE
LEUKOCYTE ESTERASE UR QL STRIP.AUTO: ABNORMAL
NITRITE UR QL STRIP: NEGATIVE
PH UR STRIP.AUTO: 7 [PH] (ref 5–8)
PROT UR QL STRIP: NEGATIVE
RBC # UR STRIP: ABNORMAL /HPF
REF LAB TEST METHOD: ABNORMAL
SP GR UR STRIP: 1.01 (ref 1–1.03)
SQUAMOUS #/AREA URNS HPF: ABNORMAL /HPF
T VAGINALIS SPEC QL WET PREP: ABNORMAL
UROBILINOGEN UR QL STRIP: ABNORMAL
WBC # UR STRIP: ABNORMAL /HPF
WBC SPEC QL WET PREP: ABNORMAL
YEAST GENITAL QL WET PREP: ABNORMAL

## 2025-03-06 PROCEDURE — 87210 SMEAR WET MOUNT SALINE/INK: CPT | Performed by: OBSTETRICS & GYNECOLOGY

## 2025-03-06 PROCEDURE — 81001 URINALYSIS AUTO W/SCOPE: CPT | Performed by: OBSTETRICS & GYNECOLOGY

## 2025-03-06 PROCEDURE — 99282 EMERGENCY DEPT VISIT SF MDM: CPT | Performed by: OBSTETRICS & GYNECOLOGY

## 2025-03-06 PROCEDURE — G0378 HOSPITAL OBSERVATION PER HR: HCPCS

## 2025-03-06 PROCEDURE — 87086 URINE CULTURE/COLONY COUNT: CPT | Performed by: OBSTETRICS & GYNECOLOGY

## 2025-03-06 NOTE — PROGRESS NOTES
" Christiano Peterson  : 1995  MRN: [id number]  CSN: [id number]    Labor & Delivery TERELL progress note    Subjective   Chief Complaint: She reports feeling somewhat crampy occasionally. Emotional visit with MFM yesterday with continued shortening of cervix. 20 minutes spent at bedside discussing cervical change and allowing her to share the emotional concerns she has with findings, and her loss from previous PROM at this gestational age. Reassured no contractions noted, some of the discomforts she described sounded like it could be consistent with round ligament pain, so we discussed that. She is concerned that her cervix has continued to shorten and she's felt nothing. Explained that to be the very hallmark of the disorder, and that having regular \"symptoms or cramps\" would be a reason to not do the stitch she had discussed with the MFM. She is just petrified about having a second loss, or missing the chance to be able to get the cerclage if that's what the MFM recommends. She has begun vaginal progesterone since her visit yesterday. States she thinks it gives her insomnia, suggested the insomnia may actually be related to her anxiety. Did offer the patient a maternity belt.    HPI:     History:    Prenatal Information:  Prenatal Results       Initial Prenatal Labs       Test Value Reference Range Date Time    Hemoglobin  11.6 g/dL 11.1 - 15.9 24 1358    Hematocrit  35.0 % 34.0 - 46.6 24 1358    Platelets  288 x10E3/uL 150 - 450 24 1358    Rubella IgG  2.18 index Immune >0.99 24 1358    Hepatitis B SAg  Negative  Negative 24 1358    Hepatitis C Ab  Non Reactive  Non Reactive 24 1358    RPR  Non Reactive  Non Reactive 24 1358    T. Pallidum Ab         ABO  O   24 1358    Rh  Positive   24 1358    Antibody Screen  Negative  Negative 24 1358    HIV  Non Reactive  Non Reactive 24 1358    Urine Culture  Final report   24 1358    " Gonorrhea  Negative  Negative 12/05/24 1358    Chlamydia  Negative  Negative 12/05/24 1358    TSH        HgB A1c         Varicella IgG        Hemoglobinopathy Fractionation        Hemoglobinopathy (genetic testing)        Cystic fibrosis   Negative   01/03/25 1141    Spinal muscular atrophy  Negative   01/03/25 1141    Fragile X                  Fetal testing        Test Value Reference Range Date Time    NIPT        MSAFP        AFP-4                  2nd and 3rd Trimester       Test Value Reference Range Date Time    Hemoglobin (repeated)        Hematocrit (repeated)        Platelets   288 x10E3/uL 150 - 450 12/05/24 1358    1 hour GTT         Antibody Screen (repeated)        3rd TM syphilis scrn (repeated)  RPR         3rd TM syphilis scrn (repeated) TP-Ab        3rd TM syphilis screen TB-Ab (FTA)        Syphilis cascade test TP-Ab (EIA)        Syphilis cascade TPPA        GTT Fasting        GTT 1 Hr        GTT 2 Hr        GTT 3 Hr        Group B Strep                  Other testing        Test Value Reference Range Date Time    Parvo IgG         CMV IgG                   Drug Screening       Test Value Reference Range Date Time    Amphetamine Screen        Barbiturate Screen        Benzodiazepine Screen        Methadone Screen        Phencyclidine Screen        Opiates Screen        THC Screen        Cocaine Screen        Propoxyphene Screen        Buprenorphine Screen        Methamphetamine Screen        Oxycodone Screen        Tricyclic Antidepressants Screen                  Legend    ^: Historical                          External Prenatal Results       Pregnancy Outside Results - Transcribed From Office Records - See Scanned Records For Details       Test Value Date Time    ABO  O  12/05/24 1358    Rh  Positive  12/05/24 1358    Antibody Screen  Negative  12/05/24 1358    Varicella IgG       Rubella  2.18 index 12/05/24 1358    Hgb  11.6 g/dL 12/05/24 1358    Hct  35.0 % 12/05/24 1358    HgB A1c        1h  GTT       3h GTT Fasting       3h GTT 1 hour       3h GTT 2 hour       3h GTT 3 hour        Gonorrhea (discrete)  Negative  24 1358    Chlamydia (discrete)  Negative  24 1358    RPR  Non Reactive  24 1358    Syphils cascade: TP-Ab (FTA)       TP-Ab       TP-Ab (EIA)       TPPA       HBsAg  Negative  24 1358    Herpes Simplex Virus PCR       Herpes Simplex VIrus Culture       HIV  Non Reactive  24 1358    Hep C RNA Quant PCR       Hep C Antibody  Non Reactive  24 1358    AFP       NIPT       Cystic Fibrosis ([place])  Negative  25 1141    Cystic Fibroisis        Spinal Muscular atrophy  Negative  25 1141    Fragile X       Group B Strep       GBS Susceptibility to Clindamycin       GBS Susceptibility to Erythromycin       Fetal Fibronectin       Genetic Testing, Maternal Blood                 Drug Screening       Test Value Date Time    Urine Drug Screen       Amphetamine Screen       Barbiturate Screen       Benzodiazepine Screen       Methadone Screen       Phencyclidine Screen       Opiates Screen       THC Screen       Cocaine Screen       Propoxyphene Screen       Buprenorphine Screen       Methamphetamine Screen       Oxycodone Screen       Tricyclic Antidepressants Screen                 Legend    ^: Historical                             Past OB History:     OB History    Para Term  AB Living   2 1 0 1 0 0   SAB IAB Ectopic Molar Multiple Live Births   0 0 0 0 0 1      # Outcome Date GA Lbr Amari/2nd Weight Sex Type Anes PTL Lv   2 Current            1  20 20w1d  330 g (11.6 oz) M Vag-Spont None Y ND      Birth Comments:  Death at 20 weeks      Name: COLLIN STERN      Apgar1: 2  Apgar5: 2       Past Medical History: Past Medical History:   Diagnosis Date    Depression     Gastroesophageal reflux disease 2024    GERD (gastroesophageal reflux disease)       Past Surgical History Past Surgical History:   Procedure  Laterality Date    DILATATION AND CURETTAGE N/A 2/3/2020    Procedure: DILATATION AND CURETTAGE;  Surgeon: Ca Ying MD;  Location: [facility] LABOR DELIVERY;  Service: Obstetrics/Gynecology      Family History: Family History   Problem Relation Age of Onset    Kidney failure Maternal Grandmother     Psychosis Maternal Grandmother         Liver    Throat cancer Maternal Grandfather     Bone cancer Maternal Grandfather     Thyroid disease Maternal Grandfather       Social History:  reports that she has never smoked. She has never been exposed to tobacco smoke. She has never used smokeless tobacco.   reports that she does not currently use alcohol.   reports no history of drug use.           High Risk Medical Conditions/Complaints this visit: shortened cervix    Discussion of Management or Test Interpretation with physician/provider/healthcare provider: no    External Records Reviewed: Prenatal history in [facility] from [facility] [place] - LARISSA Valle reviewed, pregnancy complicated by: shortened cervix, previous 20 week loss after PROM    Review Previous Test Results: Prenatal labs in [facility] reviewed, history of: normal labs    Independent Historian: None    Social Determinants to Health: No drug, transportation or housing or other issues noted       Objective   Medical Decision Making:  Amount/Complexity of Data Reviewed  -Labs ordered  -Imaging ordered  -IV fluids given  Risk:  Prescription drug management  Drug therapy requiring intensive monitoring for toxicity  Decision to admit patient - no  Diagnosis/Treatment limited by social determinants    Min/max vitals past 24 hours:  Temp  Min: 97.8 °F (36.6 °C)  Max: 97.8 °F (36.6 °C)   BP  Min: 138/83  Max: 138/83   Pulse  Min: 98  Max: 98   Resp  Min: 16  Max: 16        Independent Interpretation NST/FHT's: +FHT by doppler    Cervix: was not checked.   Contractions:    Review of current test: results none    none       Final Diagnoses: Shortened cervix        Assessment   IUP at 20w2d  Shortening of cervix     Plan   UA and wet prep  Maternity belt    Ca Ying MD  3/6/2025  14:56 CST

## 2025-03-08 LAB — BACTERIA SPEC AEROBE CULT: NO GROWTH

## 2025-03-31 ENCOUNTER — ROUTINE PRENATAL (OUTPATIENT)
Age: 30
End: 2025-03-31
Payer: COMMERCIAL

## 2025-03-31 VITALS — DIASTOLIC BLOOD PRESSURE: 78 MMHG | BODY MASS INDEX: 40.94 KG/M2 | WEIGHT: 238.5 LBS | SYSTOLIC BLOOD PRESSURE: 102 MMHG

## 2025-03-31 DIAGNOSIS — O09.212 PREVIOUS PRETERM DELIVERY, ANTEPARTUM, SECOND TRIMESTER: Primary | ICD-10-CM

## 2025-03-31 DIAGNOSIS — Z3A.23 23 WEEKS GESTATION OF PREGNANCY: ICD-10-CM

## 2025-03-31 PROCEDURE — 0502F SUBSEQUENT PRENATAL CARE: CPT | Performed by: OBSTETRICS & GYNECOLOGY

## 2025-03-31 NOTE — PROGRESS NOTES
Good fetal movement  Reviewed normal anatomy US  Cervical length was 2.4cm without funneling at 22 weeks  Glucola and Hgb ordered for next visit  Has MFM appointment in 2 days    Diagnoses and all orders for this visit:    1. Previous  delivery, antepartum, second trimester (Primary)    2. 23 weeks gestation of pregnancy

## 2025-04-28 ENCOUNTER — ROUTINE PRENATAL (OUTPATIENT)
Age: 30
End: 2025-04-28
Payer: COMMERCIAL

## 2025-04-28 VITALS — BODY MASS INDEX: 41.88 KG/M2 | SYSTOLIC BLOOD PRESSURE: 132 MMHG | WEIGHT: 244 LBS | DIASTOLIC BLOOD PRESSURE: 88 MMHG

## 2025-04-28 DIAGNOSIS — O09.212 PREVIOUS PRETERM DELIVERY, ANTEPARTUM, SECOND TRIMESTER: ICD-10-CM

## 2025-04-28 DIAGNOSIS — Z3A.27 27 WEEKS GESTATION OF PREGNANCY: Primary | ICD-10-CM

## 2025-04-28 PROCEDURE — 0502F SUBSEQUENT PRENATAL CARE: CPT | Performed by: OBSTETRICS & GYNECOLOGY

## 2025-04-28 NOTE — PROGRESS NOTES
Good fetal movement  Reviewed normal MFM ultrasound , has another appointment in 2 days  Glucola and Hgb today, Rh positive  Discuss Tdap next visit  Discussed Brian Olson contractions    Diagnoses and all orders for this visit:    1. 27 weeks gestation of pregnancy (Primary)  -     Gestational Screen 1 Hr ([facility])  -     RPR Qualitative with Reflex to Quant  -     Hemoglobin    2. Previous  delivery, antepartum, second trimester

## 2025-04-29 LAB
GLUCOSE 1H P 50 G GLC PO SERPL-MCNC: 127 MG/DL (ref 70–139)
HGB BLD-MCNC: 10.8 G/DL (ref 11.1–15.9)
RPR SER QL: NON REACTIVE

## 2025-05-12 ENCOUNTER — TELEPHONE (OUTPATIENT)
Dept: OBSTETRICS AND GYNECOLOGY | Age: 30
End: 2025-05-12

## 2025-05-12 NOTE — TELEPHONE ENCOUNTER
Caller: Jyotsna Peterson    Relationship:  Self    Best call back number: [phone]    PATIENT CALLED REQUESTING TO CANCEL SAME DAY APPT.    Did the patient call AFTER the start time of their scheduled appointment?  []YES  [x]NO    Was the patient's appointment rescheduled? [x]YES  []NO

## 2025-05-20 ENCOUNTER — ROUTINE PRENATAL (OUTPATIENT)
Age: 30
End: 2025-05-20
Payer: COMMERCIAL

## 2025-05-20 VITALS — DIASTOLIC BLOOD PRESSURE: 82 MMHG | BODY MASS INDEX: 43.6 KG/M2 | SYSTOLIC BLOOD PRESSURE: 112 MMHG | WEIGHT: 254 LBS

## 2025-05-20 DIAGNOSIS — O09.212 PREVIOUS PRETERM DELIVERY, ANTEPARTUM, SECOND TRIMESTER: ICD-10-CM

## 2025-05-20 DIAGNOSIS — Z3A.31 31 WEEKS GESTATION OF PREGNANCY: Primary | ICD-10-CM

## 2025-05-20 NOTE — PROGRESS NOTES
Good fetal movement  No contractions, recommend Pepcid for reflux  Reviewed normal MFM growth ultrasound at 28 weeks (52%)  Reviewed normal Glucola and Hgb  Tdap in office today   labor precautions    Diagnoses and all orders for this visit:    1. 31 weeks gestation of pregnancy (Primary)  -     Tdap Vaccine Greater Than or Equal To 6yo IM    2. Previous  delivery, antepartum, second trimester

## 2025-05-29 ENCOUNTER — ROUTINE PRENATAL (OUTPATIENT)
Age: 30
End: 2025-05-29
Payer: COMMERCIAL

## 2025-05-29 VITALS — BODY MASS INDEX: 44.11 KG/M2 | SYSTOLIC BLOOD PRESSURE: 116 MMHG | DIASTOLIC BLOOD PRESSURE: 82 MMHG | WEIGHT: 257 LBS

## 2025-05-29 DIAGNOSIS — Z3A.32 32 WEEKS GESTATION OF PREGNANCY: Primary | ICD-10-CM

## 2025-05-29 LAB
GLUCOSE UR STRIP-MCNC: NEGATIVE MG/DL
PROT UR STRIP-MCNC: ABNORMAL MG/DL

## 2025-05-30 NOTE — PROGRESS NOTES
Good fetal movement  No contractions   labor precautions  Some ble swelling, encouraged rest with elevation and increasing hydration  BP wnl, urine testing appropriate /82   Wt 117 kg (257 lb)   LMP 10/14/2024   BMI 44.11 kg/m²    Next OV with Dr. Valle with Ultrasound for growth and evaluation of CL    Review of Systems     Diagnoses and all orders for this visit:    1. 32 weeks gestation of pregnancy (Primary)  -     Cancel: POC Pregnancy, Urine  -     POC Urinalysis Dipstick

## 2025-06-09 ENCOUNTER — ROUTINE PRENATAL (OUTPATIENT)
Age: 30
End: 2025-06-09
Payer: COMMERCIAL

## 2025-06-09 VITALS — BODY MASS INDEX: 45.14 KG/M2 | DIASTOLIC BLOOD PRESSURE: 84 MMHG | SYSTOLIC BLOOD PRESSURE: 130 MMHG | WEIGHT: 263 LBS

## 2025-06-09 DIAGNOSIS — Z34.83 ENCOUNTER FOR SUPERVISION OF OTHER NORMAL PREGNANCY IN THIRD TRIMESTER: ICD-10-CM

## 2025-06-09 DIAGNOSIS — Z3A.33 33 WEEKS GESTATION OF PREGNANCY: Primary | ICD-10-CM

## 2025-06-09 PROCEDURE — 0502F SUBSEQUENT PRENATAL CARE: CPT | Performed by: OBSTETRICS & GYNECOLOGY

## 2025-06-09 NOTE — PROGRESS NOTES
Good fetal movement  Labor precautions  US today 45% growth, MAZIN 14.2 cm, vertex  Reviewed normal 1 hour glucose screening  Stop vaginal progesterone at 35-36 weeks    Diagnoses and all orders for this visit:    1. 33 weeks gestation of pregnancy (Primary)    2. Encounter for supervision of other normal pregnancy in third trimester

## 2025-06-20 ENCOUNTER — ROUTINE PRENATAL (OUTPATIENT)
Age: 30
End: 2025-06-20
Payer: COMMERCIAL

## 2025-06-20 VITALS — SYSTOLIC BLOOD PRESSURE: 126 MMHG | BODY MASS INDEX: 45.76 KG/M2 | DIASTOLIC BLOOD PRESSURE: 86 MMHG | WEIGHT: 266.6 LBS

## 2025-06-20 DIAGNOSIS — O09.212 PREVIOUS PRETERM DELIVERY, ANTEPARTUM, SECOND TRIMESTER: ICD-10-CM

## 2025-06-20 DIAGNOSIS — Z3A.35 35 WEEKS GESTATION OF PREGNANCY: Primary | ICD-10-CM

## 2025-06-20 NOTE — PROGRESS NOTES
Good fetal movement  Has stopped vaginal progesterone  Normal growth US last visit  Labor precautions  GBS and cx's next visit    Diagnoses and all orders for this visit:    1. 35 weeks gestation of pregnancy (Primary)    2. Previous  delivery, antepartum, second trimester

## 2025-06-30 ENCOUNTER — ROUTINE PRENATAL (OUTPATIENT)
Age: 30
End: 2025-06-30
Payer: COMMERCIAL

## 2025-06-30 VITALS — DIASTOLIC BLOOD PRESSURE: 98 MMHG | WEIGHT: 271 LBS | BODY MASS INDEX: 46.52 KG/M2 | SYSTOLIC BLOOD PRESSURE: 130 MMHG

## 2025-06-30 DIAGNOSIS — Z3A.36 36 WEEKS GESTATION OF PREGNANCY: Primary | ICD-10-CM

## 2025-06-30 DIAGNOSIS — O09.212 PREVIOUS PRETERM DELIVERY, ANTEPARTUM, SECOND TRIMESTER: ICD-10-CM

## 2025-06-30 PROCEDURE — 0502F SUBSEQUENT PRENATAL CARE: CPT | Performed by: OBSTETRICS & GYNECOLOGY

## 2025-06-30 NOTE — PROGRESS NOTES
Good fetal movement  Has had a few contractions  Reviewed normal growth US 3 weeks ago  Cervix 3-4cm/50/-3 soft, mid position  GBS and GC/Chl ordered and done  Labor instructions    Diagnoses and all orders for this visit:    1. 36 weeks gestation of pregnancy (Primary)  -     Chlamydia trachomatis, Neisseria gonorrhoeae, PCR w/ confirmation - Swab, Cervix  -     Strep B Screen - Swab, Vaginal/Rectum    2. Previous  delivery, antepartum, second trimester

## 2025-07-02 LAB
C TRACH RRNA SPEC QL NAA+PROBE: NEGATIVE
GP B STREP DNA SPEC QL NAA+PROBE: NEGATIVE
N GONORRHOEA RRNA SPEC QL NAA+PROBE: NEGATIVE

## 2025-07-07 ENCOUNTER — ROUTINE PRENATAL (OUTPATIENT)
Age: 30
End: 2025-07-07
Payer: COMMERCIAL

## 2025-07-07 VITALS — DIASTOLIC BLOOD PRESSURE: 84 MMHG | WEIGHT: 271 LBS | BODY MASS INDEX: 46.52 KG/M2 | SYSTOLIC BLOOD PRESSURE: 122 MMHG

## 2025-07-07 DIAGNOSIS — O09.212 PREVIOUS PRETERM DELIVERY, ANTEPARTUM, SECOND TRIMESTER: ICD-10-CM

## 2025-07-07 DIAGNOSIS — Z3A.37 37 WEEKS GESTATION OF PREGNANCY: Primary | ICD-10-CM

## 2025-07-07 PROCEDURE — 0502F SUBSEQUENT PRENATAL CARE: CPT | Performed by: OBSTETRICS & GYNECOLOGY

## 2025-07-07 NOTE — PROGRESS NOTES
Good fetal movement  No contractions  Cervix 4/60/-3 soft, mid position  Reviewed GBS negative  Labor instructions    Diagnoses and all orders for this visit:    1. 37 weeks gestation of pregnancy (Primary)    2. Previous  delivery, antepartum, second trimester

## 2025-07-14 ENCOUNTER — ROUTINE PRENATAL (OUTPATIENT)
Age: 30
End: 2025-07-14
Payer: COMMERCIAL

## 2025-07-14 VITALS — DIASTOLIC BLOOD PRESSURE: 86 MMHG | BODY MASS INDEX: 46.86 KG/M2 | WEIGHT: 273 LBS | SYSTOLIC BLOOD PRESSURE: 142 MMHG

## 2025-07-14 DIAGNOSIS — O09.212 PREVIOUS PRETERM DELIVERY, ANTEPARTUM, SECOND TRIMESTER: ICD-10-CM

## 2025-07-14 DIAGNOSIS — Z3A.38 38 WEEKS GESTATION OF PREGNANCY: Primary | ICD-10-CM

## 2025-07-14 NOTE — PROGRESS NOTES
Good fetal movement  No contractions, no headache, no protein  Reviewed normal 1 hour glucose screen  Reviewed GBS negative  Labor instructions, plan induction in 2-3 days    Diagnoses and all orders for this visit:    1. 38 weeks gestation of pregnancy (Primary)    2. Previous  delivery, antepartum, second trimester         Admission

## 2025-07-15 ENCOUNTER — PREP FOR SURGERY (OUTPATIENT)
Dept: OTHER | Facility: HOSPITAL | Age: 30
End: 2025-07-15
Payer: COMMERCIAL

## 2025-07-15 RX ORDER — OXYTOCIN/0.9 % SODIUM CHLORIDE 30/500 ML
2-20 PLASTIC BAG, INJECTION (ML) INTRAVENOUS
Status: CANCELLED | OUTPATIENT
Start: 2025-07-15

## 2025-07-15 RX ORDER — ONDANSETRON 4 MG/1
4 TABLET, ORALLY DISINTEGRATING ORAL EVERY 6 HOURS PRN
Status: CANCELLED | OUTPATIENT
Start: 2025-07-15

## 2025-07-15 RX ORDER — CARBOPROST TROMETHAMINE 250 UG/ML
250 INJECTION, SOLUTION INTRAMUSCULAR AS NEEDED
Status: CANCELLED | OUTPATIENT
Start: 2025-07-15

## 2025-07-15 RX ORDER — ONDANSETRON 2 MG/ML
4 INJECTION INTRAMUSCULAR; INTRAVENOUS EVERY 6 HOURS PRN
Status: CANCELLED | OUTPATIENT
Start: 2025-07-15

## 2025-07-15 RX ORDER — BUTORPHANOL TARTRATE 1 MG/ML
1 INJECTION, SOLUTION INTRAMUSCULAR; INTRAVENOUS
Status: CANCELLED | OUTPATIENT
Start: 2025-07-15

## 2025-07-15 RX ORDER — IBUPROFEN 600 MG/1
600 TABLET, FILM COATED ORAL EVERY 6 HOURS PRN
Status: CANCELLED | OUTPATIENT
Start: 2025-07-15

## 2025-07-15 RX ORDER — CALCIUM CARBONATE 500 MG/1
2 TABLET, CHEWABLE ORAL 3 TIMES DAILY PRN
Status: CANCELLED | OUTPATIENT
Start: 2025-07-15

## 2025-07-15 RX ORDER — SODIUM CHLORIDE 9 MG/ML
40 INJECTION, SOLUTION INTRAVENOUS AS NEEDED
Status: CANCELLED | OUTPATIENT
Start: 2025-07-15

## 2025-07-15 RX ORDER — MISOPROSTOL 200 UG/1
800 TABLET ORAL AS NEEDED
Status: CANCELLED | OUTPATIENT
Start: 2025-07-15

## 2025-07-15 RX ORDER — SODIUM CHLORIDE, SODIUM LACTATE, POTASSIUM CHLORIDE, CALCIUM CHLORIDE 600; 310; 30; 20 MG/100ML; MG/100ML; MG/100ML; MG/100ML
125 INJECTION, SOLUTION INTRAVENOUS CONTINUOUS
Status: CANCELLED | OUTPATIENT
Start: 2025-07-15 | End: 2025-07-17

## 2025-07-15 RX ORDER — SODIUM CHLORIDE 0.9 % (FLUSH) 0.9 %
10 SYRINGE (ML) INJECTION EVERY 12 HOURS SCHEDULED
Status: CANCELLED | OUTPATIENT
Start: 2025-07-15

## 2025-07-15 RX ORDER — OXYTOCIN/0.9 % SODIUM CHLORIDE 30/500 ML
250 PLASTIC BAG, INJECTION (ML) INTRAVENOUS CONTINUOUS
Status: CANCELLED | OUTPATIENT
Start: 2025-07-15 | End: 2025-07-15

## 2025-07-15 RX ORDER — SODIUM CHLORIDE 0.9 % (FLUSH) 0.9 %
10 SYRINGE (ML) INJECTION AS NEEDED
Status: CANCELLED | OUTPATIENT
Start: 2025-07-15

## 2025-07-15 RX ORDER — ACETAMINOPHEN 325 MG/1
650 TABLET ORAL EVERY 4 HOURS PRN
Status: CANCELLED | OUTPATIENT
Start: 2025-07-15

## 2025-07-15 RX ORDER — METHYLERGONOVINE MALEATE 0.2 MG/ML
200 INJECTION INTRAVENOUS ONCE AS NEEDED
Status: CANCELLED | OUTPATIENT
Start: 2025-07-15

## 2025-07-15 RX ORDER — CITRIC ACID/SODIUM CITRATE 334-500MG
30 SOLUTION, ORAL ORAL ONCE AS NEEDED
Status: CANCELLED | OUTPATIENT
Start: 2025-07-15

## 2025-07-15 RX ORDER — OXYTOCIN/0.9 % SODIUM CHLORIDE 30/500 ML
999 PLASTIC BAG, INJECTION (ML) INTRAVENOUS ONCE
Status: CANCELLED | OUTPATIENT
Start: 2025-07-15 | End: 2025-07-15

## 2025-07-15 RX ORDER — BUTORPHANOL TARTRATE 2 MG/ML
2 INJECTION, SOLUTION INTRAMUSCULAR; INTRAVENOUS
Status: CANCELLED | OUTPATIENT
Start: 2025-07-15

## 2025-07-15 RX ORDER — TERBUTALINE SULFATE 1 MG/ML
0.25 INJECTION SUBCUTANEOUS AS NEEDED
Status: CANCELLED | OUTPATIENT
Start: 2025-07-15

## 2025-07-15 NOTE — H&P (VIEW-ONLY)
[facility]  Jyotsna Stern  : 1995  MRN: [id number]  CSN: [id number]    History and Physical    Subjective   Jyotsna Stern is a 29 y.o. year old  with an Estimated Date of Delivery: 25 scheduled for induction of labor on  due to elective.  Prenatal care has been with Dr. Arthur Valle.  It has been complicated by prior pregnancy delivered at 16 weeks with ruptured membranes.    OB History    Para Term  AB Living   2 1 0 1 0 0   SAB IAB Ectopic Molar Multiple Live Births   0 0 0 0 0 1      # Outcome Date GA Lbr Amari/2nd Weight Sex Type Anes PTL Lv   2 Current            1  20 20w1d  330 g (11.6 oz) M Vag-Spont None Y ND      Birth Comments:  Death at 20 weeks      Name: COLLIN STERN      Apgar1: 2  Apgar5: 2     Past Medical History:   Diagnosis Date    Depression     Gastroesophageal reflux disease 2024    GERD (gastroesophageal reflux disease)      Past Surgical History:   Procedure Laterality Date    DILATATION AND CURETTAGE N/A 2/3/2020    Procedure: DILATATION AND CURETTAGE;  Surgeon: Ca Ying MD;  Location: [facility] LABOR DELIVERY;  Service: Obstetrics/Gynecology       Current Outpatient Medications:     Misc. Devices (Breast Pump) misc, Use 1 Device As Needed (for breastfeeding)., Disp: 1 each, Rfl: 0    prenatal vitamin (prenatal, CLASSIC, vitamin) tablet, Take  by mouth Daily., Disp: , Rfl:     Allergies   Allergen Reactions    Egg-Derived Products Hives    Latex Rash and Swelling    Wound Dressing Adhesive Rash     Bandaids, adhesive tapes     Social History    Tobacco Use      Smoking status: Never        Passive exposure: Never      Smokeless tobacco: Never    Review of Systems      Objective   LMP 10/14/2024   General: well developed; well nourished  no acute distress   Heart: regular rate and rhythm   Lungs: breathing is unlabored   Abdomen:  Cervix:  Presentation:  EFW by Leopold's:  EFW by recent u/s: soft,  non-tender; no masses  was checked (by me): 4 cm / 60 % / -3  Vertex  8#       Prenatal Labs  Lab Results   Component Value Date    HGB 10.8 (L) 2025    HEPBSAG Negative 2024    ABO O 2024    RH Positive 2024    ABSCRN Negative 2024    [id number] Non Reactive 2024    HEPCVIRUSABY Non Reactive 2024    URINECX No growth 2025       Recent Labs  Lab Results   Component Value Date    HGB 10.8 (L) 2025    HCT 35.0 2024    WBC 8.1 2024     2024           Assessment   IUP with an Estimated Date of Delivery: 25  Induction of labor scheduled on  for elective.  The patient's pelvis feels clinically adequate for IOL to be appropriate, although she understands that this clinical judgement is not always accurate.  Group B Strep status: negative         Plan   Risks and benefits of induction discussed.  Patient understands that IOL increases the risk of  delivery over spontaneous labor, especially if the patient does not have a favorable cervix.    Plan Pitocin induction    Neil Valle MD  7/15/2025

## 2025-07-15 NOTE — H&P
[facility]  Jyotsna Stern  : 1995  MRN: [id number]  CSN: [id number]    History and Physical    Subjective   Jyotsna Stern is a 29 y.o. year old  with an Estimated Date of Delivery: 25 scheduled for induction of labor on  due to elective.  Prenatal care has been with Dr. Arthur Valle.  It has been complicated by prior pregnancy delivered at 16 weeks with ruptured membranes.    OB History    Para Term  AB Living   2 1 0 1 0 0   SAB IAB Ectopic Molar Multiple Live Births   0 0 0 0 0 1      # Outcome Date GA Lbr Amari/2nd Weight Sex Type Anes PTL Lv   2 Current            1  20 20w1d  330 g (11.6 oz) M Vag-Spont None Y ND      Birth Comments:  Death at 20 weeks      Name: COLLIN STERN      Apgar1: 2  Apgar5: 2     Past Medical History:   Diagnosis Date    Depression     Gastroesophageal reflux disease 2024    GERD (gastroesophageal reflux disease)      Past Surgical History:   Procedure Laterality Date    DILATATION AND CURETTAGE N/A 2/3/2020    Procedure: DILATATION AND CURETTAGE;  Surgeon: Ca Ying MD;  Location: [facility] LABOR DELIVERY;  Service: Obstetrics/Gynecology       Current Outpatient Medications:     Misc. Devices (Breast Pump) misc, Use 1 Device As Needed (for breastfeeding)., Disp: 1 each, Rfl: 0    prenatal vitamin (prenatal, CLASSIC, vitamin) tablet, Take  by mouth Daily., Disp: , Rfl:     Allergies   Allergen Reactions    Egg-Derived Products Hives    Latex Rash and Swelling    Wound Dressing Adhesive Rash     Bandaids, adhesive tapes     Social History    Tobacco Use      Smoking status: Never        Passive exposure: Never      Smokeless tobacco: Never    Review of Systems      Objective   LMP 10/14/2024   General: well developed; well nourished  no acute distress   Heart: regular rate and rhythm   Lungs: breathing is unlabored   Abdomen:  Cervix:  Presentation:  EFW by Leopold's:  EFW by recent u/s: soft,  non-tender; no masses  was checked (by me): 4 cm / 60 % / -3  Vertex  8#       Prenatal Labs  Lab Results   Component Value Date    HGB 10.8 (L) 2025    HEPBSAG Negative 2024    ABO O 2024    RH Positive 2024    ABSCRN Negative 2024    [id number] Non Reactive 2024    HEPCVIRUSABY Non Reactive 2024    URINECX No growth 2025       Recent Labs  Lab Results   Component Value Date    HGB 10.8 (L) 2025    HCT 35.0 2024    WBC 8.1 2024     2024           Assessment   IUP with an Estimated Date of Delivery: 25  Induction of labor scheduled on  for elective.  The patient's pelvis feels clinically adequate for IOL to be appropriate, although she understands that this clinical judgement is not always accurate.  Group B Strep status: negative         Plan   Risks and benefits of induction discussed.  Patient understands that IOL increases the risk of  delivery over spontaneous labor, especially if the patient does not have a favorable cervix.    Plan Pitocin induction    Neil Valle MD  7/15/2025

## 2025-07-17 ENCOUNTER — ANESTHESIA EVENT (OUTPATIENT)
Dept: LABOR AND DELIVERY | Facility: HOSPITAL | Age: 30
End: 2025-07-17
Payer: COMMERCIAL

## 2025-07-17 ENCOUNTER — HOSPITAL ENCOUNTER (INPATIENT)
Facility: HOSPITAL | Age: 30
LOS: 2 days | Discharge: HOME | End: 2025-07-19
Attending: OBSTETRICS & GYNECOLOGY | Admitting: OBSTETRICS & GYNECOLOGY
Payer: COMMERCIAL

## 2025-07-17 ENCOUNTER — ANESTHESIA (OUTPATIENT)
Dept: LABOR AND DELIVERY | Facility: HOSPITAL | Age: 30
End: 2025-07-17
Payer: COMMERCIAL

## 2025-07-17 ENCOUNTER — HOSPITAL ENCOUNTER (OUTPATIENT)
Dept: LABOR AND DELIVERY | Facility: HOSPITAL | Age: 30
Discharge: HOME | End: 2025-07-17
Payer: COMMERCIAL

## 2025-07-17 PROBLEM — Z3A.39 39 WEEKS GESTATION OF PREGNANCY: Status: ACTIVE | Noted: 2025-07-17

## 2025-07-17 LAB
ABO GROUP BLD: NORMAL
BASOPHILS # BLD AUTO: 0.02 10*3/MM3 (ref 0–0.2)
BASOPHILS NFR BLD AUTO: 0.2 % (ref 0–1.5)
BLD GP AB SCN SERPL QL: NEGATIVE
DEPRECATED RDW RBC AUTO: 48.7 FL (ref 37–54)
EOSINOPHIL # BLD AUTO: 0.32 10*3/MM3 (ref 0–0.4)
EOSINOPHIL NFR BLD AUTO: 3.9 % (ref 0.3–6.2)
ERYTHROCYTE [DISTWIDTH] IN BLOOD BY AUTOMATED COUNT: 15.6 % (ref 12.3–15.4)
HCT VFR BLD AUTO: 33.2 % (ref 34–46.6)
HGB BLD-MCNC: 10.9 G/DL (ref 12–15.9)
IMM GRANULOCYTES # BLD AUTO: 0.04 10*3/MM3 (ref 0–0.05)
IMM GRANULOCYTES NFR BLD AUTO: 0.5 % (ref 0–0.5)
LYMPHOCYTES # BLD AUTO: 1.34 10*3/MM3 (ref 0.7–3.1)
LYMPHOCYTES NFR BLD AUTO: 16.3 % (ref 19.6–45.3)
MCH RBC QN AUTO: 28.7 PG (ref 26.6–33)
MCHC RBC AUTO-ENTMCNC: 32.8 G/DL (ref 31.5–35.7)
MCV RBC AUTO: 87.4 FL (ref 79–97)
MONOCYTES # BLD AUTO: 0.55 10*3/MM3 (ref 0.1–0.9)
MONOCYTES NFR BLD AUTO: 6.7 % (ref 5–12)
NEUTROPHILS NFR BLD AUTO: 5.96 10*3/MM3 (ref 1.7–7)
NEUTROPHILS NFR BLD AUTO: 72.4 % (ref 42.7–76)
NRBC BLD AUTO-RTO: 0 /100 WBC (ref 0–0.2)
PLATELET # BLD AUTO: 199 10*3/MM3 (ref 140–450)
PMV BLD AUTO: 10 FL (ref 6–12)
RBC # BLD AUTO: 3.8 10*6/MM3 (ref 3.77–5.28)
RH BLD: POSITIVE
T&S EXPIRATION DATE: NORMAL
TREPONEMA PALLIDUM IGG+IGM AB [PRESENCE] IN SERUM OR PLASMA BY IMMUNOASSAY: NORMAL
WBC NRBC COR # BLD AUTO: 8.23 10*3/MM3 (ref 3.4–10.8)

## 2025-07-17 PROCEDURE — 86900 BLOOD TYPING SEROLOGIC ABO: CPT | Performed by: OBSTETRICS & GYNECOLOGY

## 2025-07-17 PROCEDURE — 25010000002 ROPIVACAINE PER 1 MG: Performed by: NURSE ANESTHETIST, CERTIFIED REGISTERED

## 2025-07-17 PROCEDURE — 85025 COMPLETE CBC W/AUTO DIFF WBC: CPT | Performed by: OBSTETRICS & GYNECOLOGY

## 2025-07-17 PROCEDURE — 0KQM0ZZ REPAIR PERINEUM MUSCLE, OPEN APPROACH: ICD-10-PCS | Performed by: OBSTETRICS & GYNECOLOGY

## 2025-07-17 PROCEDURE — C1755 CATHETER, INTRASPINAL: HCPCS | Performed by: NURSE ANESTHETIST, CERTIFIED REGISTERED

## 2025-07-17 PROCEDURE — 25810000003 LACTATED RINGERS PER 1000 ML: Performed by: OBSTETRICS & GYNECOLOGY

## 2025-07-17 PROCEDURE — 51702 INSERT TEMP BLADDER CATH: CPT

## 2025-07-17 PROCEDURE — 86780 TREPONEMA PALLIDUM: CPT | Performed by: OBSTETRICS & GYNECOLOGY

## 2025-07-17 PROCEDURE — 3E033VJ INTRODUCTION OF OTHER HORMONE INTO PERIPHERAL VEIN, PERCUTANEOUS APPROACH: ICD-10-PCS | Performed by: OBSTETRICS & GYNECOLOGY

## 2025-07-17 PROCEDURE — 86850 RBC ANTIBODY SCREEN: CPT | Performed by: OBSTETRICS & GYNECOLOGY

## 2025-07-17 PROCEDURE — 86901 BLOOD TYPING SEROLOGIC RH(D): CPT | Performed by: OBSTETRICS & GYNECOLOGY

## 2025-07-17 RX ORDER — IBUPROFEN 600 MG/1
600 TABLET, FILM COATED ORAL EVERY 6 HOURS PRN
Status: DISCONTINUED | OUTPATIENT
Start: 2025-07-17 | End: 2025-07-19 | Stop reason: HOSPADM

## 2025-07-17 RX ORDER — OXYTOCIN/0.9 % SODIUM CHLORIDE 30/500 ML
125 PLASTIC BAG, INJECTION (ML) INTRAVENOUS ONCE AS NEEDED
Status: DISCONTINUED | OUTPATIENT
Start: 2025-07-17 | End: 2025-07-19 | Stop reason: HOSPADM

## 2025-07-17 RX ORDER — ROPIVACAINE HYDROCHLORIDE 2 MG/ML
INJECTION, SOLUTION EPIDURAL; INFILTRATION; PERINEURAL AS NEEDED
Status: DISCONTINUED | OUTPATIENT
Start: 2025-07-17 | End: 2025-07-17 | Stop reason: SURG

## 2025-07-17 RX ORDER — BISACODYL 10 MG
10 SUPPOSITORY, RECTAL RECTAL DAILY PRN
Status: DISCONTINUED | OUTPATIENT
Start: 2025-07-18 | End: 2025-07-19 | Stop reason: HOSPADM

## 2025-07-17 RX ORDER — SODIUM CHLORIDE 0.9 % (FLUSH) 0.9 %
10 SYRINGE (ML) INJECTION AS NEEDED
Status: DISCONTINUED | OUTPATIENT
Start: 2025-07-17 | End: 2025-07-17 | Stop reason: HOSPADM

## 2025-07-17 RX ORDER — DOCUSATE SODIUM 100 MG/1
100 CAPSULE, LIQUID FILLED ORAL 2 TIMES DAILY
Status: DISCONTINUED | OUTPATIENT
Start: 2025-07-17 | End: 2025-07-19 | Stop reason: HOSPADM

## 2025-07-17 RX ORDER — ONDANSETRON 2 MG/ML
4 INJECTION INTRAMUSCULAR; INTRAVENOUS EVERY 6 HOURS PRN
Status: DISCONTINUED | OUTPATIENT
Start: 2025-07-17 | End: 2025-07-17 | Stop reason: HOSPADM

## 2025-07-17 RX ORDER — LIDOCAINE HYDROCHLORIDE AND EPINEPHRINE 15; 5 MG/ML; UG/ML
INJECTION, SOLUTION EPIDURAL AS NEEDED
Status: DISCONTINUED | OUTPATIENT
Start: 2025-07-17 | End: 2025-07-17 | Stop reason: SURG

## 2025-07-17 RX ORDER — ACETAMINOPHEN 325 MG/1
650 TABLET ORAL EVERY 6 HOURS PRN
Status: DISCONTINUED | OUTPATIENT
Start: 2025-07-17 | End: 2025-07-19 | Stop reason: HOSPADM

## 2025-07-17 RX ORDER — SODIUM CHLORIDE 0.9 % (FLUSH) 0.9 %
10 SYRINGE (ML) INJECTION EVERY 12 HOURS SCHEDULED
Status: DISCONTINUED | OUTPATIENT
Start: 2025-07-17 | End: 2025-07-17 | Stop reason: HOSPADM

## 2025-07-17 RX ORDER — HYDROXYZINE HYDROCHLORIDE 25 MG/1
50 TABLET, FILM COATED ORAL NIGHTLY PRN
Status: DISCONTINUED | OUTPATIENT
Start: 2025-07-17 | End: 2025-07-19 | Stop reason: HOSPADM

## 2025-07-17 RX ORDER — PRENATAL VIT/IRON FUM/FOLIC AC 27MG-0.8MG
1 TABLET ORAL DAILY
Status: DISCONTINUED | OUTPATIENT
Start: 2025-07-17 | End: 2025-07-19 | Stop reason: HOSPADM

## 2025-07-17 RX ORDER — ONDANSETRON 2 MG/ML
4 INJECTION INTRAMUSCULAR; INTRAVENOUS EVERY 6 HOURS PRN
Status: DISCONTINUED | OUTPATIENT
Start: 2025-07-17 | End: 2025-07-17 | Stop reason: SDUPTHER

## 2025-07-17 RX ORDER — OXYTOCIN/0.9 % SODIUM CHLORIDE 30/500 ML
250 PLASTIC BAG, INJECTION (ML) INTRAVENOUS CONTINUOUS
Status: ACTIVE | OUTPATIENT
Start: 2025-07-17 | End: 2025-07-17

## 2025-07-17 RX ORDER — OXYTOCIN/0.9 % SODIUM CHLORIDE 30/500 ML
999 PLASTIC BAG, INJECTION (ML) INTRAVENOUS ONCE
Status: DISCONTINUED | OUTPATIENT
Start: 2025-07-17 | End: 2025-07-17 | Stop reason: HOSPADM

## 2025-07-17 RX ORDER — LIDOCAINE HYDROCHLORIDE 20 MG/ML
20 INJECTION, SOLUTION INFILTRATION; PERINEURAL AS NEEDED
Status: DISCONTINUED | OUTPATIENT
Start: 2025-07-17 | End: 2025-07-17

## 2025-07-17 RX ORDER — BUTORPHANOL TARTRATE 2 MG/ML
1 INJECTION, SOLUTION INTRAMUSCULAR; INTRAVENOUS
Status: DISCONTINUED | OUTPATIENT
Start: 2025-07-17 | End: 2025-07-17 | Stop reason: HOSPADM

## 2025-07-17 RX ORDER — MORPHINE SULFATE 2 MG/ML
1 INJECTION, SOLUTION INTRAMUSCULAR; INTRAVENOUS EVERY 4 HOURS PRN
Status: DISCONTINUED | OUTPATIENT
Start: 2025-07-17 | End: 2025-07-18 | Stop reason: ALTCHOICE

## 2025-07-17 RX ORDER — TRAMADOL HYDROCHLORIDE 50 MG/1
50 TABLET ORAL EVERY 6 HOURS PRN
Status: DISCONTINUED | OUTPATIENT
Start: 2025-07-17 | End: 2025-07-19 | Stop reason: HOSPADM

## 2025-07-17 RX ORDER — SODIUM CHLORIDE 9 MG/ML
40 INJECTION, SOLUTION INTRAVENOUS AS NEEDED
Status: DISCONTINUED | OUTPATIENT
Start: 2025-07-17 | End: 2025-07-17 | Stop reason: HOSPADM

## 2025-07-17 RX ORDER — NALOXONE HCL 0.4 MG/ML
0.4 VIAL (ML) INJECTION
Status: DISCONTINUED | OUTPATIENT
Start: 2025-07-17 | End: 2025-07-18 | Stop reason: ALTCHOICE

## 2025-07-17 RX ORDER — CITRIC ACID/SODIUM CITRATE 334-500MG
30 SOLUTION, ORAL ORAL ONCE AS NEEDED
Status: DISCONTINUED | OUTPATIENT
Start: 2025-07-17 | End: 2025-07-17 | Stop reason: HOSPADM

## 2025-07-17 RX ORDER — ONDANSETRON 2 MG/ML
4 INJECTION INTRAMUSCULAR; INTRAVENOUS EVERY 6 HOURS PRN
Status: DISCONTINUED | OUTPATIENT
Start: 2025-07-17 | End: 2025-07-19 | Stop reason: HOSPADM

## 2025-07-17 RX ORDER — ONDANSETRON 4 MG/1
4 TABLET, ORALLY DISINTEGRATING ORAL EVERY 6 HOURS PRN
Status: DISCONTINUED | OUTPATIENT
Start: 2025-07-17 | End: 2025-07-17 | Stop reason: SDUPTHER

## 2025-07-17 RX ORDER — PROMETHAZINE HYDROCHLORIDE 25 MG/1
25 TABLET ORAL EVERY 6 HOURS PRN
Status: DISCONTINUED | OUTPATIENT
Start: 2025-07-17 | End: 2025-07-19 | Stop reason: HOSPADM

## 2025-07-17 RX ORDER — EPHEDRINE SULFATE 50 MG/ML
10 INJECTION, SOLUTION INTRAVENOUS
Status: DISCONTINUED | OUTPATIENT
Start: 2025-07-17 | End: 2025-07-17 | Stop reason: HOSPADM

## 2025-07-17 RX ORDER — METHYLERGONOVINE MALEATE 0.2 MG/ML
200 INJECTION INTRAVENOUS ONCE AS NEEDED
Status: DISCONTINUED | OUTPATIENT
Start: 2025-07-17 | End: 2025-07-17 | Stop reason: HOSPADM

## 2025-07-17 RX ORDER — IBUPROFEN 600 MG/1
600 TABLET, FILM COATED ORAL EVERY 6 HOURS PRN
Status: DISCONTINUED | OUTPATIENT
Start: 2025-07-17 | End: 2025-07-17 | Stop reason: HOSPADM

## 2025-07-17 RX ORDER — CALCIUM CARBONATE 500 MG/1
2 TABLET, CHEWABLE ORAL 3 TIMES DAILY PRN
Status: DISCONTINUED | OUTPATIENT
Start: 2025-07-17 | End: 2025-07-19 | Stop reason: HOSPADM

## 2025-07-17 RX ORDER — HYDROCORTISONE 25 MG/G
1 CREAM TOPICAL AS NEEDED
Status: DISCONTINUED | OUTPATIENT
Start: 2025-07-17 | End: 2025-07-19 | Stop reason: HOSPADM

## 2025-07-17 RX ORDER — CARBOPROST TROMETHAMINE 250 UG/ML
250 INJECTION, SOLUTION INTRAMUSCULAR AS NEEDED
Status: DISCONTINUED | OUTPATIENT
Start: 2025-07-17 | End: 2025-07-17 | Stop reason: HOSPADM

## 2025-07-17 RX ORDER — OXYTOCIN/0.9 % SODIUM CHLORIDE 30/500 ML
2-20 PLASTIC BAG, INJECTION (ML) INTRAVENOUS
Status: DISCONTINUED | OUTPATIENT
Start: 2025-07-17 | End: 2025-07-17 | Stop reason: HOSPADM

## 2025-07-17 RX ORDER — MISOPROSTOL 200 UG/1
800 TABLET ORAL AS NEEDED
Status: DISCONTINUED | OUTPATIENT
Start: 2025-07-17 | End: 2025-07-17 | Stop reason: HOSPADM

## 2025-07-17 RX ORDER — ONDANSETRON 4 MG/1
4 TABLET, ORALLY DISINTEGRATING ORAL EVERY 6 HOURS PRN
Status: DISCONTINUED | OUTPATIENT
Start: 2025-07-17 | End: 2025-07-17 | Stop reason: HOSPADM

## 2025-07-17 RX ORDER — METHYLERGONOVINE MALEATE 0.2 MG/ML
200 INJECTION INTRAVENOUS ONCE AS NEEDED
Status: DISCONTINUED | OUTPATIENT
Start: 2025-07-17 | End: 2025-07-19 | Stop reason: HOSPADM

## 2025-07-17 RX ORDER — BUTORPHANOL TARTRATE 2 MG/ML
2 INJECTION, SOLUTION INTRAMUSCULAR; INTRAVENOUS
Status: DISCONTINUED | OUTPATIENT
Start: 2025-07-17 | End: 2025-07-17 | Stop reason: HOSPADM

## 2025-07-17 RX ORDER — PROMETHAZINE HYDROCHLORIDE 12.5 MG/1
12.5 SUPPOSITORY RECTAL EVERY 6 HOURS PRN
Status: DISCONTINUED | OUTPATIENT
Start: 2025-07-17 | End: 2025-07-19 | Stop reason: HOSPADM

## 2025-07-17 RX ORDER — MISOPROSTOL 200 UG/1
600 TABLET ORAL ONCE AS NEEDED
Status: DISCONTINUED | OUTPATIENT
Start: 2025-07-17 | End: 2025-07-19 | Stop reason: HOSPADM

## 2025-07-17 RX ORDER — CALCIUM CARBONATE 500 MG/1
2 TABLET, CHEWABLE ORAL 3 TIMES DAILY PRN
Status: DISCONTINUED | OUTPATIENT
Start: 2025-07-17 | End: 2025-07-17 | Stop reason: SDUPTHER

## 2025-07-17 RX ORDER — ONDANSETRON 4 MG/1
4 TABLET, ORALLY DISINTEGRATING ORAL EVERY 8 HOURS PRN
Status: DISCONTINUED | OUTPATIENT
Start: 2025-07-17 | End: 2025-07-19 | Stop reason: HOSPADM

## 2025-07-17 RX ORDER — SODIUM CHLORIDE, SODIUM LACTATE, POTASSIUM CHLORIDE, CALCIUM CHLORIDE 600; 310; 30; 20 MG/100ML; MG/100ML; MG/100ML; MG/100ML
125 INJECTION, SOLUTION INTRAVENOUS CONTINUOUS
Status: DISCONTINUED | OUTPATIENT
Start: 2025-07-17 | End: 2025-07-17

## 2025-07-17 RX ORDER — SODIUM CHLORIDE 0.9 % (FLUSH) 0.9 %
1-10 SYRINGE (ML) INJECTION AS NEEDED
Status: DISCONTINUED | OUTPATIENT
Start: 2025-07-17 | End: 2025-07-19 | Stop reason: HOSPADM

## 2025-07-17 RX ORDER — ACETAMINOPHEN 325 MG/1
650 TABLET ORAL EVERY 4 HOURS PRN
Status: DISCONTINUED | OUTPATIENT
Start: 2025-07-17 | End: 2025-07-17 | Stop reason: HOSPADM

## 2025-07-17 RX ORDER — TERBUTALINE SULFATE 1 MG/ML
0.25 INJECTION SUBCUTANEOUS AS NEEDED
Status: DISCONTINUED | OUTPATIENT
Start: 2025-07-17 | End: 2025-07-17 | Stop reason: HOSPADM

## 2025-07-17 RX ADMIN — ROPIVACAINE HYDROCHLORIDE 7 ML/HR: 2 INJECTION, SOLUTION EPIDURAL; INFILTRATION at 10:11

## 2025-07-17 RX ADMIN — DOCUSATE SODIUM 100 MG: 100 CAPSULE, LIQUID FILLED ORAL at 21:03

## 2025-07-17 RX ADMIN — IBUPROFEN 600 MG: 600 TABLET, FILM COATED ORAL at 21:02

## 2025-07-17 RX ADMIN — ROPIVACAINE HYDROCHLORIDE 8 ML: 2 INJECTION, SOLUTION EPIDURAL; INFILTRATION at 10:05

## 2025-07-17 RX ADMIN — LIDOCAINE HYDROCHLORIDE AND EPINEPHRINE 3 ML: 15; 5 INJECTION, SOLUTION EPIDURAL at 10:01

## 2025-07-17 RX ADMIN — PRENATAL VIT W/ FE FUMARATE-FA TAB 27-0.8 MG 1 TABLET: 27-0.8 TAB at 21:03

## 2025-07-17 RX ADMIN — SODIUM CHLORIDE, POTASSIUM CHLORIDE, SODIUM LACTATE AND CALCIUM CHLORIDE 125 ML/HR: 600; 310; 30; 20 INJECTION, SOLUTION INTRAVENOUS at 06:35

## 2025-07-17 RX ADMIN — Medication: at 19:59

## 2025-07-17 RX ADMIN — IBUPROFEN 600 MG: 600 TABLET, FILM COATED ORAL at 14:19

## 2025-07-17 RX ADMIN — Medication 2 MILLI-UNITS/MIN: at 06:55

## 2025-07-17 NOTE — DISCHARGE INSTR - APPOINTMENTS
Appointment with Deann Vieira on August 1st at 1:00 pm     Appointment with  on August 28th at 2:15 pm

## 2025-07-17 NOTE — LACTATION NOTE
Mother's Name: Jyotsna  Phone #: [phone]  Infant Name: Meredith   : 2025 @ 1243  Gestation: 39w2d  Day of life: 0   Birth weight:  7-0.2 (3180 g)  Discharge weight:  Weight Loss:   24 hour Summary of Feeds:  Voids:  Stools:  Assistive devices (shields, shells, etc):  Significant Maternal history: , first delivery was a 20 week  demise, Depression and GERD  Maternal Concerns:    Maternal Goal: atleast 6 months    Mother's Medications: PNV   Breastpump for home: yes, [employer]    Ped follow up appt: unsure     Called to assist with feeding by ADT RN. Infant STS on mother's chest, mother reports that infant has already fed for 10 minutes and would like to resume feeding on left breast. With permission assisted with positioning infant in football hold at left breast. Assisted mother with sandwiching breast to latch infant. Infant latched well and fed for 15 minutes prior to self releasing. Infant was burped and then brought to right breast in football hold, infant latched for about 2 minutes prior to self releasing and not showing hunger cues after un latching. Mother requested for infant to be placed STS on her chest for a break at this time. Breastfeeding booklet and feeding log provided and reviewed with mother. Educated to feed infant every 3 hours or sooner if infant is demonstrating hunger cues. Educated on benefits of STS and delayed bathing. Encouraged pumping and hand expression if unable to latch and feed infant. Encouraged to call if needing any further assistance with feeding.     Instructed patient our lactation team is here for continued support throughout their breastfeeding journey. Our team has encouraged patient to call with any questions or concerns that may arise after discharge.     Breastfeeding and Diaper Chart  Check List for Essentials of Positioning And Latch-on handout provided by Lactation Education Resources  Hand Expression handout provided by Lactation Education  Resources  Five Keys to Successful Breastfeeding handout provided by Lactation Education Resources    The Many Benefits if Breastfeeding handout given  Breastfeeding saves time  *Breastfeeding allows you to calm or feed your baby immediately, which leads to a happier baby who cries less  *There is nothing to buy, prepare, or maintain.There is nothing to clean or sterilize.  Breastfeeding builds a mothers confidence  *She knows all her baby needs to thrive is her!  Breastfeeding saves Money  *There is no formula to buy and healthier breast fed babies have less medical costs  Healthy Mom/Healthy baby  * babies get sick less often, and when they do they are usually sick less severely and for a shorter time  * babies have fewer ear infections  * babies have fewer allergies  *Mothers who breastfeed have a lower risk for cancer, osteoporosis, anemia, high blood pressure, obesity, and Type ll diabetes  *Mothers miss less work days with sick babies  Breast fed babies have a better dental health  * babies have better jaw development which requires lest orthodontic work  *Breast milk does not promote cavities  * babies can nurse at night without worry of tooth decay  Breastfeeding allows a baby to reach his full IQ potential  *The longer a baby is breast fed, the better their brain development  Breast fed babies and moms are more relaxed  *The hormones released during breastfeeding have a calming effect on mothers  *Breastfeeding requires mom to take a break; this may help mom get more rest after delivery  *Breastfeeding is quicker than preparing formula which allows mom and baby to get back to sleep faster  *Breastfeeding promotes bonding and allows mom to learn babies cues and care needs more quickly  Breastfeeding cleanup is easier  *The bowel movements and spit up of breast fed babies doesn't smell as bad  *Spit-up of breast fed babies doesn't stain clothing  Getting out of the  hourse is easier  *No formula bottles to prepare and carry safely   *No time restraints due to worry about what baby will eat  *No worries about warming a bottle or finding safe water to prepare bottles  Breastfeeding mother get their bodies back sooner  *The uterus shrinks more quickly and completely, which allows a flatter tummy  *Breastfeeding burns 400-500 calories a day; making milk torches stored fat!  Breastfeeding is better for the environment  *There is no trash to dispose of after breastfeeding  *There is no production facility to produce breast milk; moms body does it all without the pollution of a factory      Your Guide to Breastfeeding Booklet by [employer], www.[employer]      Safe Storage of Breastmilk magnet: [email or web address]

## 2025-07-17 NOTE — PLAN OF CARE
Goal Outcome Evaluation:  Plan of Care Reviewed With: patient        Progress: no change  Outcome Evaluation: pt presents to LDR for scheduled elective IOL, plan pitocin. GBS negative. undecided on epidural for labor

## 2025-07-17 NOTE — ANESTHESIA PROCEDURE NOTES
Labor Epidural      Patient reassessed immediately prior to procedure    Patient location during procedure: OB  Performed By  CRNA/JOSIANE: Gloria Fraser CRNA  Preanesthetic Checklist  Completed: patient identified, IV checked, site marked, risks and benefits discussed, surgical consent, monitors and equipment checked, pre-op evaluation and timeout performed  Prep:  Pt Position:sitting  Sterile Tech:cap, gloves, sterile barrier and mask  Prep:chlorhexidine gluconate and isopropyl alcohol  Monitoring:blood pressure monitoring and continuous pulse oximetry  Epidural Block Procedure:  Approach:midline  Guidance:landmark technique  Location:L5-S1  Needle Type:Tuohy  Needle Gauge:17 G  Loss of Resistance Medium: saline  Loss of Resistance: 10cm  Cath Depth at skin:15 cm  Paresthesia: none  Aspiration:negative  Test Dose:negative  Number of Attempts: 1  Post Assessment:  Dressing:occlusive dressing applied and secured with tape  Pt Tolerance:patient tolerated the procedure well with no apparent complications  Complications:no

## 2025-07-17 NOTE — ANESTHESIA PREPROCEDURE EVALUATION
Anesthesia Evaluation     Patient summary reviewed and Nursing notes reviewed   no history of anesthetic complications:   NPO Solid Status: Waived due to emergency  NPO Liquid Status: Waived due to emergency           Airway   Mallampati: II  No difficulty expected  Dental - normal exam     Pulmonary - negative pulmonary ROS   Cardiovascular - negative cardio ROS  Exercise tolerance: good (4-7 METS)        Neuro/Psych- negative ROS  GI/Hepatic/Renal/Endo    (+) morbid obesity, GERD    Musculoskeletal (-) negative ROS    Abdominal    Substance History - negative use     OB/GYN    (+) Pregnant, pregnancy induced hypertension        Other                      Anesthesia Plan    ASA 2 - emergent     MAC     intravenous induction     Anesthetic plan, risks, benefits, and alternatives have been provided, discussed and informed consent has been obtained with: patient and spouse/significant other.

## 2025-07-17 NOTE — L&D DELIVERY NOTE
" [facility]   Vaginal Delivery Note    Patient Name: Jyotsna Peterson  : 1995  MRN: [id number]    Date of Delivery: 2025    Diagnosis     Pre & Post-Delivery:  Intrauterine pregnancy at 39w2d  Labor status: Induced Onset of Labor    39 weeks gestation of pregnancy             Problem List    Transfer to Postpartum     Review the Delivery Report for details.     Delivery     Delivery: Vaginal, Spontaneous    YOB: 2026   Time of Birth:  Gestational Age 12:43 PM  39w2d     Anesthesia: Epidural    Delivering clinician: Neil Valle   Forceps?   No   Vacuum? No    Shoulder dystocia present: No        Delivery narrative:  Progressed to C/C/+2 and pushed well to deliver a LVIF. KEVIN to LOT with nuchal cord x 1 reduced, vigorous to mom's abdomen. Cord clamping delayed for 60 seconds. Placenta spontaneous and intact with 3VC after IV Pitocin.  Second-degree perineal laceration repaired with 2-0 Vicryl Rapide. Epidural anes.  mL. No complications. Sponge and needle count correct.       Infant     Findings: female infant     Infant observations: Weight: 3180 g (7 lb 0.2 oz)  Length: 20 in  Observations/Comments:  HC 33 cm     Apgars: 9  @ 1 minute /    9  @ 5 minutes   Infant Name:      Placenta & Cord         Placenta delivered  Spontaneous at   2025 12:48 PM    Cord: 3 vessels present.   Nuchal Cord?  yes; Number of nuchal loops present:  1   Cord blood obtained: Yes   Cord gases obtained:  No   Cord gas results: Venous:  No results found for: \"PHCVEN\", \"BECVEN\"    Arterial:  No results found for: \"PHCART\", \"BECART\"     Repair     Episiotomy: None    No    Lacerations: Yes  Laceration Information  Laceration Repaired?   Perineal: 2nd Yes   Periurethral:       Labial:       Sulcus:       Vaginal:       Cervical:         Suture used for repair: 2-0 Vicryl     Estimated Blood Loss:       Quantitative Blood Loss:    QBL from VAG DEL: 293 (25 1328)   TOTAL BLOOD LOSS : " 293 mL (7/17/2025  6:16 AM - 7/17/2025  1:50 PM)  Complications     none    Disposition     Mother to Mother Baby/Postpartum  in stable condition currently.  Baby to remains with mom  in stable condition currently.    Neil Valle MD  07/17/25  13:50 CDT

## 2025-07-18 LAB
HCT VFR BLD AUTO: 27.4 % (ref 34–46.6)
HGB BLD-MCNC: 8.6 G/DL (ref 12–15.9)

## 2025-07-18 PROCEDURE — 85014 HEMATOCRIT: CPT | Performed by: OBSTETRICS & GYNECOLOGY

## 2025-07-18 PROCEDURE — 85018 HEMOGLOBIN: CPT | Performed by: OBSTETRICS & GYNECOLOGY

## 2025-07-18 RX ADMIN — DOCUSATE SODIUM 100 MG: 100 CAPSULE, LIQUID FILLED ORAL at 08:29

## 2025-07-18 RX ADMIN — TRAMADOL HYDROCHLORIDE 50 MG: 50 TABLET, COATED ORAL at 04:01

## 2025-07-18 RX ADMIN — IBUPROFEN 600 MG: 600 TABLET, FILM COATED ORAL at 14:42

## 2025-07-18 RX ADMIN — ACETAMINOPHEN 650 MG: 325 TABLET ORAL at 04:01

## 2025-07-18 RX ADMIN — IBUPROFEN 600 MG: 600 TABLET, FILM COATED ORAL at 07:34

## 2025-07-18 RX ADMIN — IBUPROFEN 600 MG: 600 TABLET, FILM COATED ORAL at 20:27

## 2025-07-18 RX ADMIN — ACETAMINOPHEN 650 MG: 325 TABLET ORAL at 09:46

## 2025-07-18 RX ADMIN — ACETAMINOPHEN 650 MG: 325 TABLET ORAL at 17:07

## 2025-07-18 RX ADMIN — Medication: at 08:30

## 2025-07-18 RX ADMIN — TRAMADOL HYDROCHLORIDE 50 MG: 50 TABLET, COATED ORAL at 09:46

## 2025-07-18 RX ADMIN — DOCUSATE SODIUM 100 MG: 100 CAPSULE, LIQUID FILLED ORAL at 20:27

## 2025-07-18 NOTE — PLAN OF CARE
Goal Outcome Evaluation:  Plan of Care Reviewed With: patient        Progress: improving  Outcome Evaluation: VSS, ff ml uu scant lochia, c/o perineal pain - comfort products at bedside, tylenol, motrin, and ultram for pain control, breastfeeding with assistance, bonding well with infant, spouse at bedside

## 2025-07-18 NOTE — ANESTHESIA POSTPROCEDURE EVALUATION
"Patient: Jyotsna Peterson    Procedure Summary       Date: 07/17/25 Room / Location:     Anesthesia Start: 0950 Anesthesia Stop: 1243    Procedure: LABOR ANALGESIA Diagnosis:     Scheduled Providers:  Provider: Gloria Fraser CRNA    Anesthesia Type: MAC ASA Status: 2 - Emergent            Anesthesia Type: MAC    Vitals  Vitals Value Taken Time   /91 07/18/25 09:00   Temp 98.5 °F (36.9 °C) 07/18/25 09:00   Pulse 92 07/18/25 09:00   Resp 18 07/18/25 09:00   SpO2 99 % 07/18/25 09:00           Post Anesthesia Care and Evaluation    Patient location during evaluation: floor  Patient participation: complete - patient participated  Level of consciousness: awake and alert  Pain management: satisfactory to patient    Airway patency: patent  Anesthetic complications: No anesthetic complications  PONV Status: none  Cardiovascular status: acceptable  Respiratory status: acceptable  Hydration status: acceptable  Post Neuraxial Block status: Motor and sensory function returned to baseline and No signs or symptoms of PDPH  Comments: Blood pressure 131/91, pulse 92, temperature 98.5 °F (36.9 °C), temperature source Temporal, resp. rate 18, height 162.6 cm (64\"), weight 123 kg (270 lb 3.2 oz), last menstrual period 10/14/2024, SpO2 99%, unknown if currently breastfeeding.      "

## 2025-07-18 NOTE — PROGRESS NOTES
"      FADUMO Willard  [facility]  [address]  [place] [address]  Office [phone]  Fax [phone]    [facility]  Vaginal Delivery Progress Note    Subjective   Postpartum Day 1: Vaginal Delivery    Patient feeling well.  She is using Tylenol, Motrin, and Ultram for discomfort.  Ambulating and voiding without difficulty.  Describes bleeding as thin lochia.  Breast-feeding and relates infant is latching.      Objective     Vital Signs Range for the last 24 hours  Temperature: Temp:  [97.6 °F (36.4 °C)-98.5 °F (36.9 °C)] 97.6 °F (36.4 °C)   Temp Source: Temp src: Temporal   BP: BP: (119-152)/(63-99) 133/87   Pulse: Heart Rate:  [] 85   Respirations: Resp:  [16-20] 20   SPO2: SpO2:  [92 %-100 %] 98 %   O2 Amount (l/min):     O2 Devices Device (Oxygen Therapy): room air   Weight:       Admit Height:  Height: 162.6 cm (64\")      Physical Exam:  General:  no acute distresss. Cooperative. Calm. Normal mood and affect.  Abdomen: soft, nontender and with firm fundus. Extremities: calves nontender, movement without difficulty, trace edema.     Lab results reviewed:  Yes   Rubella:  No results found for: \"RUBELLAIGGIN\" Nurse Transcribed from prenatal record --  No components found for: \"EXTRUBELQUAL\"  Rh Status:    RH type   Date Value Ref Range Status   07/17/2025 Positive  Final     Immunizations:   Immunization History   Administered Date(s) Administered    DTP / HiB 1994, 02/28/1995, 05/01/1995, 02/27/1996    DTaP, Unspecified 10/30/1998    Fluzone  >6mos 01/13/2025    Fluzone (or Fluarix & Flulaval for VFC) >6mos 10/17/2017, 11/14/2023    Hep B, Adolescent or Pediatric 1994, 1994, 10/30/1995    Influenza, Unspecified 11/15/2023    MMR 02/27/1996, 10/30/1998    OPV 10/30/1998    Polio, Unspecified 1994, 02/28/1995, 05/01/1995    Tdap 03/22/2006, 11/17/2012, 05/20/2025    Varicella 08/21/1998    influenza Split 03/16/2017     Lab Results (last 24 hours)       " Procedure Component Value Units Date/Time    Hemoglobin & Hematocrit, Blood [[id number]]  (Abnormal) Collected: 07/18/25 0602    Specimen: Blood Updated: 07/18/25 0631     Hemoglobin 8.6 g/dL      Hematocrit 27.4 %     Treponema pallidum AB w/Reflex RPR [[id number]]  (Normal) Collected: 07/17/25 0635    Specimen: Blood Updated: 07/17/25 1210     Treponemal AB Total Non-Reactive    Narrative:      Reactive results will reflex RPR testing.            External Prenatal Results       Pregnancy Outside Results - Transcribed From Office Records - See Scanned Records For Details       Test Value Date Time    ABO  O  07/17/25 0635    Rh  Positive  07/17/25 0635    Antibody Screen  Negative  07/17/25 0635       Negative  12/05/24 1358    Varicella IgG       Rubella  2.18 index 12/05/24 1358    Hgb  10.9 g/dL 07/17/25 0635       10.8 g/dL 04/28/25 0931       11.6 g/dL 12/05/24 1358    Hct  33.2 % 07/17/25 0635       35.0 % 12/05/24 1358    HgB A1c        1h GTT  127 mg/dL 04/28/25 0931    3h GTT Fasting       3h GTT 1 hour       3h GTT 2 hour       3h GTT 3 hour        Gonorrhea (discrete)  Negative  06/30/25 1420       Negative  12/05/24 1358    Chlamydia (discrete)  Negative  06/30/25 1420       Negative  12/05/24 1358    RPR  Non Reactive  04/28/25 0931       Non Reactive  12/05/24 1358    Syphils cascade: TP-Ab (FTA)  Non-Reactive  07/17/25 0635    TP-Ab  Non-Reactive  07/17/25 0635    TP-Ab (EIA)       TPPA       HBsAg  Negative  12/05/24 1358    Herpes Simplex Virus PCR       Herpes Simplex VIrus Culture       HIV  Non Reactive  12/05/24 1358    Hep C RNA Quant PCR       Hep C Antibody  Non Reactive  12/05/24 1358    AFP       NIPT       Cystic Fibrosis ([place])  Negative  01/03/25 1141    Cystic Fibroisis        Spinal Muscular atrophy  Negative  01/03/25 1141    Fragile X       Group B Strep  Negative  06/30/25 1420    GBS Susceptibility to Clindamycin       GBS Susceptibility to Erythromycin       Fetal Fibronectin        Genetic Testing, Maternal Blood                 Drug Screening       Test Value Date Time    Urine Drug Screen       Amphetamine Screen       Barbiturate Screen       Benzodiazepine Screen       Methadone Screen       Phencyclidine Screen       Opiates Screen       THC Screen       Cocaine Screen       Propoxyphene Screen       Buprenorphine Screen       Methamphetamine Screen       Oxycodone Screen       Tricyclic Antidepressants Screen                 Legend    ^: Historical                            Assessment & Plan       39 weeks gestation of pregnancy      Jyotsna Peterson is Day 1  post-partum  Vaginal, Spontaneous  .      Plan:  Continue current postpartum care. Lactation support to be provided.       This note has been signed electronically.    Laurence Buckley DNP, APRN, CNM  7/18/2025  07:51 CDT

## 2025-07-18 NOTE — LACTATION NOTE
Mother's Name: Jyotsna  Phone #: [phone]  Infant Name: Meredith   : [birth year]25 @ 1243  Gestation: 39w2d  Day of life: 1  Birth weight:  7-0.2 (3180 g)  Discharge weight:  Weight Loss: -4.09%  24 hour Summary of Feeds:4BF all 1 sided EBM 3 ml Formula 5 ml x1  Voids: 2 Stools:4     Assistive devices (shields, shells, etc):  Significant Maternal history: , first delivery was a 20 week  demise, Depression and GERD  Maternal Concerns:    Maternal Goal: atleast 6 months    Mother's Medications: PNV   Breastpump for home: yes, Spectra    Ped follow up appt: unsure       Visit with patient to discuss feeding progress. Patient voices she has begun pumping and bottle feeding and has found that she prefers this over direct breastfeeding. Acknowledged concerns and challenges, and affirmed mothers decision to do what she feels most confident in. Exclusive pumping book provided and briefly reviewed. Cautioned on risks of exclusive pumping mothers: increased risk for plugged ducts, engorgement, mastitis and low milk supply. Reiterated importance of frequent pumping and assurance of achieving soft breast with each session to avoid complications. Patient voices understanding. Per request measured for flange fit. Patient measures 17 mm bilaterally. Using 18 mm flanges. Breastfeeding after discharge handout given but not reviewed. Plans for DC tomorrow.     Instructed patient our lactation team is here for continued support throughout their breastfeeding journey. Our team has encouraged patient to call with any questions or concerns that may arise after discharge.     INFORMATION FOR PUMPING MOTHERS    For Questions or Concerns Please Contact   Our Lactation Services Department  [phone]    Compiled for you by [facility] Lactation Services  Selecting a Breastpump handout from Lactation Education Resources [email or web address]  How much should my  baby eat “Breastfeeding and Human Lactation”  Bryce, 4th ed., pg. 228, 2010  Average Feeding Amounts    Age Ounces Milliliters Spoons   Day 1 Drops 5 Less than 1 teaspoon   Day 2 Less than ½ ounce 5-15 Less than 1 Tbsp.   Day 3 ½ - 1 15-30 1-2 Tbsp.   Day 4 1 - 1½  30-45 2-3 Tbsp.   Day 5 1½ - 2 45-60 3-4 Tbsp.   1-3 Weeks 2-3 60-90    1-6 Months 3-5     “Milliliters (mls), cc’s, and grams (gms)” are interchangeable terms for measurement.  30 mls = 1 ounce  My Breastpump Log with target amounts: Pump at least 8 times daily.  A few more times is even better.  Pump for about 15 minutes each time.  Gradually increase suction from low to your maximum level of comfort. Going past your comfort level will not result in increased supply.  Pain decreases output.Increasing your breastmilk supply handout from Lactation Education [employer]  Plugged Ducts and Mastitis handout from Lactation Education [employer] lactationZe-gen  Personal Use Breastpumps Medela handout [email or web address]  Hand expression handout from [employer]  Hands-on Pumping handout from [employer]  How to keep your breastpump kit clean handout Accessible version: www.cdc.gov/healthywater/hygiene/healthychildcare/infantfeeding/breastpump.html  Going back to work handout by [employer]  Breastmilk Collection and Storage Medela Handout [employer]  Preventing Engorgement Medela Handout [email or web address]     Signs of Milk: Fullness, firmness, heaviness of breasts, leaking of milk.  Signs of Good Feed: Breast fullness prior to feed, breasts soft and comfortable after feeding. Infant content after feeding: calm, sleepy, relaxed and without continued hunger cues.  Signs of Plugged Ducts, Engorgement and Mastitis: Plugged ducts (milk entrapment in milk ducts)- small tender knots that often feel like little beans under breast tissue, usually tender. Massage on these areas of concern while  breastfeeding or pumping to promote emptying.   Engorgement- fluid or excess milk, breasts become uncomfortably full, tight, firm (compare to the firmness of your cheek (mild), chin (moderate) or forehead (severe). First line of treatment should be to BREASTFEED, if breasts remain full feeling after a feeding, it may be necessary to pump, ONLY UNTIL BREASTS ARE SOFT AND COMFORTABLE. DO NOT OVER PUMP (complete emptying of breasts can trigger even more milk which will cause continued, recurrent Engorgement).  Mastitis- Infection of the breast tissue, most often caused by plugged ducts that are not adequately treated by emptying, recurrent trauma to nipples or breasts (cracked or bleeding nipples). Signs: redness, swelling, tender knots or fever to breasts as well as generalized fever >101 degrees F that is often sudden onset. Treatment of mastitis, BREASTFEED! Pump after breastfeeding to achieve COMPLETE emptying of affected breast, utilizing massage to areas of concern, may use cold compress to affected area only after breast emptying. May take anti-inflammatories i.e. Ibuprofen, Motrin. CALL your OB for assessment and continued treatment with Antibiotics to adequately treat mastitis.  Infant Care: Over the first 2 weeks it is important to keep record of infant's feeding routine (feeding times and durations), wet and dirty diaper frequency, stool color and any spit ups that may occur.  Keep in mind, ALL babies will lose some weight initially (usually no more than 10% by day 3). Until infant returns to/ surpasses birth weight (which can take up to 2 weeks), it is important to offer feedings AT LEAST EVERY 3 HOURS. Remember, if you choose to supplement infant with formula or previously pumped milk, you should always pump in replacement of that feeding in order to promote and maintain a healthy milk supply!  Maternal Care: REST, sleep when the infant sleeps, stay hydrated (water is optimal) drink to thirst, increase  caloric intake - breastfeeding mother's need an ADDITIONAL 500 calories per day , eat 3 meals/day as well as snacks in between, limit CAFFIENE intake to 2 cups/day. Ask your significant other, family members or friends for help when needed, taking advantage of meal trains, allowing others to help with laundry, house chores, etc can help you focus on what is most important early on after delivery… you and your infant, and breastfeeding!   Medications to CONTINUE: Prenatal Vitamins are important to continue taking while breastfeeding. Fish oil, magnesium/calcium supplements often are helpful to support Mothers and their milk supply as well. Tylenol, Ibuprofen, regular Zyrtec, Claritin are SAFE if you suffer from seasonal allergies. Flonase is safe and often an effective medication to take if suffering from sinus drainage/pressure.  Medications to AVOID: Benadryl, Sudafed, any medications including “DM” or have a drying effect to sinus drainage will also dry a mother's milk up. Birth control- your OB will want to address birth control options with you usually around 4-6 weeks postpartum, be sure to notify your MD if you continue to breastfeed as some birth controls may significantly decrease your milk supply. Herbals- some herbs may also decrease your milk supply: PEPPERMINT, MENTHOL in any form (candies, essential oils, teas, etc), so check labels and avoid using in excess.  Pumping: Although we encourage you to focus on breastfeeding over the first 2-4 weeks, you will need to plan to begin pumping. We do recommend implementing pumping by the time infant is 4 weeks old. Pump 2-3 times per day immediately AFTER breastfeeding, it is normal to collect very small amounts initially, but the more consistently you pump, the more you will begin to collect. Store collected milk in refrigerator or freezer. You should also begin offering infant a bottle around 4 weeks. Remember to use slow flow nipples and PACE the bottle-feed.  A bottle feed should take about as long as a breastfeeding session.

## 2025-07-18 NOTE — PLAN OF CARE
Goal Outcome Evaluation:  Plan of Care Reviewed With: patient        Progress: improving  Outcome Evaluation: vss, ffmluu, scant lochia, PRN pain meds controlling pain,

## 2025-07-19 VITALS
BODY MASS INDEX: 46.13 KG/M2 | DIASTOLIC BLOOD PRESSURE: 62 MMHG | RESPIRATION RATE: 16 BRPM | WEIGHT: 270.2 LBS | HEIGHT: 64 IN | SYSTOLIC BLOOD PRESSURE: 118 MMHG | OXYGEN SATURATION: 99 % | HEART RATE: 80 BPM | TEMPERATURE: 98 F

## 2025-07-19 PROBLEM — Z3A.39 39 WEEKS GESTATION OF PREGNANCY: Status: RESOLVED | Noted: 2025-07-17 | Resolved: 2025-07-19

## 2025-07-19 RX ORDER — TRAMADOL HYDROCHLORIDE 50 MG/1
50 TABLET ORAL EVERY 6 HOURS PRN
Qty: 8 TABLET | Refills: 0 | Status: SHIPPED | OUTPATIENT
Start: 2025-07-19 | End: 2025-07-22

## 2025-07-19 RX ADMIN — PRAMOXINE HYDROCHLORIDE AND HYDROCORTISONE ACETATE: 100; 100 AEROSOL, FOAM TOPICAL at 11:30

## 2025-07-19 RX ADMIN — ACETAMINOPHEN 650 MG: 325 TABLET ORAL at 00:20

## 2025-07-19 RX ADMIN — TRAMADOL HYDROCHLORIDE 50 MG: 50 TABLET, COATED ORAL at 09:05

## 2025-07-19 RX ADMIN — HYDROCORTISONE 2.5% 1 APPLICATION: 25 CREAM TOPICAL at 11:30

## 2025-07-19 RX ADMIN — IBUPROFEN 600 MG: 600 TABLET, FILM COATED ORAL at 06:00

## 2025-07-19 RX ADMIN — DOCUSATE SODIUM 100 MG: 100 CAPSULE, LIQUID FILLED ORAL at 08:55

## 2025-07-19 NOTE — PLAN OF CARE
Goal Outcome Evaluation:   Patient cleared for discharge per MD.

## 2025-07-19 NOTE — DISCHARGE SUMMARY
Discharge Summary     Christiano Peterson  : 1995  MRN: [id number]  CSN: [id number]    Date of Admission: 2025   Date of Discharge:  2025   Delivering Physician: Neil Valle       Admission Diagnosis: 39 weeks gestation of pregnancy [Z3A.39]   Discharge Diagnosis: Pregnancy at 39w2d - delivered       Procedures: 2025 - Vaginal, Spontaneous      Hospital Course  Patient is a 29 y.o.  who at 39w2d had a vaginal birth.  Her postpartum course was without complications.  On PPD #2 she was ready for discharge.  She had normal lochia and pain well controlled with oral medications.    Infant  female fetus weighing 3180 g (7 lb 0.2 oz)  Apgars -  9 @ 1 minute /  9 @ 5 minutes.    Discharge labs  Lab Results   Component Value Date    WBC 8.23 2025    HGB 8.6 (L) 2025    HCT 27.4 (L) 2025     2025       Discharge Medications     Discharge Medications        New Medications        Instructions Start Date   traMADol 50 MG tablet  Commonly known as: ULTRAM   50 mg, Oral, Every 6 Hours PRN             Continue These Medications        Instructions Start Date   Breast Pump misc   1 Device, Not Applicable, As Needed      prenatal (CLASSIC) vitamin 28-0.8 MG tablet tablet  Generic drug: prenatal vitamin   Daily               Discharge Disposition Home or Self Care   Condition on Discharge: good   Follow-up: 6 weeks with Arthur Medellin MD  2025

## 2025-07-19 NOTE — PROGRESS NOTES
"[facility]  Vaginal Delivery Progress Note    Subjective   Postpartum Day 2: Vaginal Delivery    The patient feels well.  Her pain is well controlled with acetaminophen, ibuprofen (OTC), and ultram.   She is ambulating well.  Patient describes her bleeding as thin lochia, already less than menses    Breastfeeding: infant latching without difficulty, patient supplementing while her milk is coming in    Objective     Vital Signs Range for the last 24 hours  Temperature: Temp:  [97.8 °F (36.6 °C)-98.5 °F (36.9 °C)] 97.8 °F (36.6 °C)   Temp Source: Temp src: Oral   BP: BP: (105-140)/(57-91) 105/57   Pulse: Heart Rate:  [87-92] 88   Respirations: Resp:  [16-18] 16   SPO2: SpO2:  [98 %-99 %] 98 %   O2 Amount (l/min):     O2 Devices Device (Oxygen Therapy): room air   Weight:       Admit Height:  Height: 162.6 cm (64\")      Physical Exam:  General:  no acute distresss.  Lungs:  breathing is unlabored  Abdomen: Fundus: appropriate, firm, non tender  Extremities: normal, atraumatic, no cyanosis, and Trace edema.     Lab results reviewed:  Yes   Rubella:  No results found for: \"RUBELLAIGGIN\" Nurse Transcribed from prenatal record --  No components found for: \"EXTRUBELQUAL\"  Rh Status:    RH type   Date Value Ref Range Status   07/17/2025 Positive  Final     Immunizations:   Immunization History   Administered Date(s) Administered    DTP / HiB 1994, 02/28/1995, 05/01/1995, 02/27/1996    DTaP, Unspecified 10/30/1998    Fluzone  >6mos 01/13/2025    Fluzone (or Fluarix & Flulaval for VFC) >6mos 10/17/2017, 11/14/2023    Hep B, Adolescent or Pediatric 1994, 1994, 10/30/1995    Influenza, Unspecified 11/15/2023    MMR 02/27/1996, 10/30/1998    OPV 10/30/1998    Polio, Unspecified 1994, 02/28/1995, 05/01/1995    Tdap 03/22/2006, 11/17/2012, 05/20/2025    Varicella 08/21/1998    influenza Split 03/16/2017     Lab Results (last 24 hours)       ** No results found for the last 24 hours. **            Assessment " & Plan       39 weeks gestation of pregnancy      Jyotsna Peterson is Day 2  post-partum  Vaginal, Spontaneous  .      Plan:  Discharge home with standard precautions and return to clinic in 4-6 weeks.      Zaida Medellin MD  7/19/2025  07:51 CDT

## 2025-07-19 NOTE — PLAN OF CARE
Goal Outcome Evaluation:  Plan of Care Reviewed With: patient, spouse        Progress: improving  Outcome Evaluation: Continue with current care. Patient is A/O, denies any HA, blurred vision, or epigastric pain. Patient denies dizziness, lightheadeness, SOB , or chest discomfort. Patient rubra is scant, fundus is firm, midline and at the umbilicus. Patient has been requesting motrin and tylenol for abdominal and perineum discomfort. Patient is breast feeding, uisng breast pump, and supplementing some feedings with formula. Bonding with NB, spouse at bedside.

## 2025-07-21 ENCOUNTER — MATERNAL SCREENING (OUTPATIENT)
Dept: CALL CENTER | Facility: HOSPITAL | Age: 30
End: 2025-07-21
Payer: COMMERCIAL

## 2025-07-21 NOTE — OUTREACH NOTE
Maternal Screening Survey      Flowsheet Row Responses   Eligibility Eligible   Prep survey completed? Yes   Facility patient discharged from? [facility] - Registered Nurse

## 2025-07-22 ENCOUNTER — TELEPHONE (OUTPATIENT)
Dept: LACTATION | Facility: HOSPITAL | Age: 30
End: 2025-07-22
Payer: COMMERCIAL

## 2025-07-22 NOTE — TELEPHONE ENCOUNTER
Per pt's Provider, Dr. Nato Deleon, pt struggling with bfing, and infant was at 9% wt loss upon their  follow up visit with him yesterday. Offered to see pt in OP lactation clinic. Pt never scheduled same.     Called pt today. No answer. Left msg acknowledging struggle with bfing, and challenges of new motherhood in general. Emotional support conveyed, emphasizing how feelings of overwhelm can be normal at this point in post partum journey. Offered support whatever feeding choice mother decides upon. Noted that phone support or OP lactation clinic help is available, as well well instructions for suppressing milk if she has chosen to formula feed. Reiterated that she is authority over feeding her child, and we are here to support her.     Lactation contact number given.

## 2025-07-29 ENCOUNTER — MATERNAL SCREENING (OUTPATIENT)
Dept: CALL CENTER | Facility: HOSPITAL | Age: 30
End: 2025-07-29
Payer: COMMERCIAL

## 2025-07-29 NOTE — OUTREACH NOTE
Maternal Screening Survey      Flowsheet Row Responses   Facility patient discharged from? [facility]   Attempt successful? No   Unsuccessful attempts Attempt 2              Jasmin PEREZ - Registered Nurse

## 2025-07-29 NOTE — OUTREACH NOTE
Maternal Screening Survey      Flowsheet Row Responses   Facility patient discharged from? [facility]   Attempt successful? No   Unsuccessful attempts Attempt 1              Jasmin Mercado Registered Nurse

## 2025-07-30 ENCOUNTER — MATERNAL SCREENING (OUTPATIENT)
Dept: CALL CENTER | Facility: HOSPITAL | Age: 30
End: 2025-07-30
Payer: COMMERCIAL

## 2025-07-30 NOTE — OUTREACH NOTE
Maternal Screening Survey      Flowsheet Row Responses   Facility patient discharged from? [facility]   Attempt successful? No   Unsuccessful attempts Attempt 3   Revoke Unable to reach              Melissa S - Registered Nurse

## 2025-08-01 ENCOUNTER — POSTPARTUM VISIT (OUTPATIENT)
Age: 30
End: 2025-08-01
Payer: COMMERCIAL

## 2025-08-01 VITALS
SYSTOLIC BLOOD PRESSURE: 124 MMHG | WEIGHT: 138.4 LBS | HEIGHT: 64 IN | DIASTOLIC BLOOD PRESSURE: 92 MMHG | BODY MASS INDEX: 23.63 KG/M2

## 2025-08-01 NOTE — PROGRESS NOTES
"Subjective   Chief Complaint   Patient presents with    Postpartum Care     Pt here for 2wk Postpartum visit, 2025 [facility] w/ Dr. Valle. Induced due to GHTN Breastfeeding. EPDS: 0 . Pt reports feeling pressure when she urinates.      Jyotsna Peterson is a 29 y.o. year old  presenting to be seen for her postpartum visit.  She had a vaginal delivery on 2025, with Dr. Valle.   2nd degree laceration with delivery, repaired.     Bleeding is light. Pain is minimal and well-controlled with motrin.   She reports perineal pressure when sitting on the toilet. Discussed that she may feel some \"pulling\" in her stitches, and that this can be normal with healing lacerations. No symptoms of UTI such as burning with urination, odor to urine, urinary frequency, or urgency.    /92 on initial exam. 122/90 on retake several minutes later. Pt denies headache, epigastric pain, vision changes. Edema 1+ to lower extremities. She will continue to monitor her symptoms/blood pressure readings at home.     Since delivery she has not been sexually active.  She does not have concerns about post-partum blues/depression.   She is breast feeding. No complaints.   Contraceptive options discussed with patient. She would like to continue to think about it. She is leaning towards natural family planing/barrier methods.      Social History    Tobacco Use      Smoking status: Never        Passive exposure: Never      Smokeless tobacco: Never    Postpartum Depression: Low Risk  (2025)    [place]  Depression Scale     Last EPDS Total Score: 1     Last EPDS Self Harm Result: Never        Objective   /92 (BP Location: Left arm, Patient Position: Sitting, Cuff Size: Large Adult)   Ht 162.6 cm (64\")   Wt 62.8 kg (138 lb 6.4 oz)   LMP 10/14/2024   Breastfeeding Yes   BMI 23.76 kg/m²     General:  well developed; well nourished  no acute distress  mentation appropriate   Abdomen: soft, non-tender; no " masses  no umbilical or inguinal hernias are present  no hepato-splenomegaly   Pelvis: Not performed.     Diagnoses and all orders for this visit:    1. Postpartum care following vaginal delivery (Primary)      RTC on 08/28/2025 for 6 week postpartum visit with Dr. Valle, or PRN.    This note was electronically signed.    Deann Vieira, FADUMO  August 1, 2025

## 2025-08-28 ENCOUNTER — POSTPARTUM VISIT (OUTPATIENT)
Age: 30
End: 2025-08-28
Payer: COMMERCIAL

## 2025-08-28 VITALS
BODY MASS INDEX: 41.15 KG/M2 | DIASTOLIC BLOOD PRESSURE: 82 MMHG | HEIGHT: 64 IN | WEIGHT: 241 LBS | SYSTOLIC BLOOD PRESSURE: 124 MMHG

## (undated) DEVICE — STERILE RAYON TIPPED OB/GYN APPLICATORS: Brand: PURITAN

## (undated) DEVICE — GLV SURG BIOGEL LTX PF 6 1/2

## (undated) DEVICE — NDL HYPO PRECISIONGLIDE REG 22G 1 1/2

## (undated) DEVICE — PK TURNOVER RM ADV

## (undated) DEVICE — GLV SURG TRIUMPH ORTHO W/ALOE PF LTX 7.0

## (undated) DEVICE — ST TB EXT STANDARDBORE 30IN

## (undated) DEVICE — SYR CONTRL LUERLOK 10CC

## (undated) DEVICE — PAD D&C: Brand: MEDLINE INDUSTRIES, INC.